# Patient Record
Sex: FEMALE | Race: WHITE | NOT HISPANIC OR LATINO | Employment: FULL TIME | URBAN - METROPOLITAN AREA
[De-identification: names, ages, dates, MRNs, and addresses within clinical notes are randomized per-mention and may not be internally consistent; named-entity substitution may affect disease eponyms.]

---

## 2017-03-31 ENCOUNTER — TELEPHONE (OUTPATIENT)
Dept: FAMILY MEDICINE | Facility: CLINIC | Age: 52
End: 2017-03-31

## 2017-03-31 DIAGNOSIS — I10 ESSENTIAL HYPERTENSION: ICD-10-CM

## 2017-03-31 NOTE — TELEPHONE ENCOUNTER
----- Message from Willa Haque sent at 3/31/2017  2:36 PM CDT -----  Contact: self [phone]   Patient requesting a refill on blood pressure medication.  Patient requesting an prescription or order for b12 shot.    Patient will be using   [id number] [place] - [address]  [place] [address]  Phone: [phone] Fax: [phone]    Please call patient at [phone]. Thanks!

## 2017-03-31 NOTE — TELEPHONE ENCOUNTER
----- Message from Baldemar Hill sent at 3/30/2017  4:25 PM CDT -----  Contact: Sander   Need refill on blood pressure medication. I scheduled an appointment but out in May. Also wants B12 shot    [facility][id number] - [place] - [address]. AT [facility]  [address].  [place] [address]  Phone: [phone] Fax: [phone]    Call back to let her know if she can get shot at [facility] if possible. [phone]. Thanks!

## 2017-04-02 RX ORDER — AMLODIPINE AND BENAZEPRIL HYDROCHLORIDE 5; 10 MG/1; MG/1
1 CAPSULE ORAL DAILY
Qty: 30 CAPSULE | Refills: 5 | OUTPATIENT
Start: 2017-04-02

## 2017-04-02 NOTE — TELEPHONE ENCOUNTER
This patient has not been seen in our office since 12/15.  She is requesting refills of medications.  She needs to get that from whoever has been providing her with refills until our upcoming appointment.  We cannot address B12 injections until she is seen.  Please find out who has been filling her meds since 12/15.

## 2017-04-03 NOTE — TELEPHONE ENCOUNTER
Spoke with patient and advised her that she has not been seen since 2015 and no refills could be authorized until her appointment.

## 2017-04-20 ENCOUNTER — PATIENT OUTREACH (OUTPATIENT)
Dept: ADMINISTRATIVE | Facility: HOSPITAL | Age: 52
End: 2017-04-20

## 2017-05-01 ENCOUNTER — PATIENT OUTREACH (OUTPATIENT)
Dept: ADMINISTRATIVE | Facility: HOSPITAL | Age: 52
End: 2017-05-01

## 2017-05-01 NOTE — LETTER
May 9, 2017    Lori Herrera  [address]  [place] [address]             [facility]  [address]  [facility] [address]  Phone: [phone] Dear Mrs. Herrera:    We have tried to reach you by mychart unsuccessfully.        [facility] is committed to your overall health.  To help you get the most out of each of your visits, we will review your information to make sure you are up to date on all of your recommended tests and/or procedures.       Dr. Hutchison has found that you may be due for tetanus vaccine, mammogram, pap smear.     If you have had any of the above done at another facility, please bring the records or information with you so that your record at [facility] will be complete.  If you would like to schedule any of these, please contact me.     If you are currently taking medication, please bring it with you to your appointment for review.     Ca Wright LPN Clinical Care Coordinator   [facility] [address] [facility] [address]   [place] [address]   [phone] (p)   [phone] (f)

## 2017-05-12 ENCOUNTER — TELEPHONE (OUTPATIENT)
Dept: FAMILY MEDICINE | Facility: CLINIC | Age: 52
End: 2017-05-12

## 2017-05-12 NOTE — TELEPHONE ENCOUNTER
----- Message from Bruna Hook sent at 5/12/2017  9:26 AM CDT -----  Contact: self  Patient wants to speak with a nurse regarding her upcoming appointment. Please call back at [phone] (pcpe)

## 2017-05-12 NOTE — TELEPHONE ENCOUNTER
Spoke with patient regarding appointment, patient cancelled upcoming appointment due to transportation problems, she was rescheduled to June, advised patient to call a week before appointment for lab orders, she verbalized understanding.

## 2017-06-01 ENCOUNTER — PATIENT OUTREACH (OUTPATIENT)
Dept: ADMINISTRATIVE | Facility: HOSPITAL | Age: 52
End: 2017-06-01

## 2018-04-10 ENCOUNTER — OFFICE VISIT (OUTPATIENT)
Dept: PRIMARY CARE CLINIC | Facility: CLINIC | Age: 53
End: 2018-04-10
Payer: COMMERCIAL

## 2018-04-10 ENCOUNTER — TELEPHONE (OUTPATIENT)
Dept: FAMILY MEDICINE | Facility: CLINIC | Age: 53
End: 2018-04-10

## 2018-04-10 VITALS
TEMPERATURE: 99 F | HEART RATE: 76 BPM | HEIGHT: 64 IN | BODY MASS INDEX: 40.83 KG/M2 | WEIGHT: 239.19 LBS | SYSTOLIC BLOOD PRESSURE: 116 MMHG | DIASTOLIC BLOOD PRESSURE: 72 MMHG

## 2018-04-10 DIAGNOSIS — Z00.00 HEALTHCARE MAINTENANCE: ICD-10-CM

## 2018-04-10 DIAGNOSIS — S09.90XA TRAUMATIC INJURY OF HEAD, INITIAL ENCOUNTER: Primary | ICD-10-CM

## 2018-04-10 PROCEDURE — 99214 OFFICE O/P EST MOD 30 MIN: CPT | Mod: S$GLB,,, | Performed by: NURSE PRACTITIONER

## 2018-04-10 PROCEDURE — 99999 PR PBB SHADOW E&M-EST. PATIENT-LVL IV: CPT | Mod: PBBFAC,,, | Performed by: NURSE PRACTITIONER

## 2018-04-10 RX ORDER — TRAMADOL HYDROCHLORIDE 50 MG/1
50 TABLET ORAL EVERY 6 HOURS PRN
Qty: 15 TABLET | Refills: 0 | Status: SHIPPED | OUTPATIENT
Start: 2018-04-10 | End: 2018-04-10 | Stop reason: SDUPTHER

## 2018-04-10 RX ORDER — TRAMADOL HYDROCHLORIDE 50 MG/1
50 TABLET ORAL EVERY 6 HOURS PRN
Qty: 15 TABLET | Refills: 0 | Status: SHIPPED | OUTPATIENT
Start: 2018-04-10 | End: 2018-04-20

## 2018-04-10 NOTE — MEDICAL/APP STUDENT
Lori Herrera is a 52 y.o. female patient of Dr. Mary Hutchison MD who presents to the clinic today for   Chief Complaint   Patient presents with    Headache    Fall   .    HPI    Patient, who is not known to me, reports a new problem to me: She slipped and fell on Wednesday and hit her head. She reports a headache that began yesterday and has been intermittent and not constant. She mother passed away last Wednesday and she was at her parents house when she slipped and fell in the kitchen and hit her head on the cabinet.     These symptoms began yesterday ago and status is not improving.     Pt denies the following symptoms: loss of consciousness, changes in mental status, changes in vision, dizziness, clear drainage from nose or ears, ringing in ears, nausea, vomiting, changes in balance or gait     Aggravating factors include none .    Relieving factors include Advil and tylenol.    OTC Medications tried are Advil and tylenol.    Prescription medications taken for symptoms are none.    She has no h/o  Migraine or tension headaches    Pertinent medical history:  No history of migraines, she get a headache occasionally     ROS    Constitutional:  Denies fever, denies fatigue.    Head:  Reports headache mostly located to the right posterior of the head where she hit it on the cabnet, denies dizziness. Report of head injury to the posterior head.  Ears:   denies drainage from the ear(s).  No difficulty hearing.  Eyes:  Denies change in vision.  No lesions or d/c present.  Nose:   No runny nose or bleeding from the nose.  Throat:  No ST pain, exudate, difficulty swallowing.    Heart/Lung:  No new sxs    GI: denies stomach ache, denies nausea, denies vomiting.    Urinary:  No changes.    MS:  No new bone, joint or muscle problems.    NEURO:  See Chief Complaint.    Skin:  No rashes, itching..      PAST MEDICAL HISTORY:  Past Medical History:   Diagnosis Date    Allergic rhinitis, cause unspecified     HTN  (hypertension)     Nasal fracture        PAST SURGICAL HISTORY:  Past Surgical History:   Procedure Laterality Date    APPENDECTOMY       SECTION, CLASSIC      CHOLECYSTECTOMY      NASAL FRACTURE SURGERY         SOCIAL HISTORY:  Social History     Social History    Marital status: Single     Spouse name: N/A    Number of children: 1    Years of education: N/A     Occupational History    Not on file.     Social History Main Topics    Smoking status: Never Smoker    Smokeless tobacco: Never Used    Alcohol use Yes      Comment: occasionally    Drug use: No    Sexual activity: Yes     Partners: Male     Other Topics Concern    Not on file     Social History Narrative    No narrative on file       FAMILY HISTORY:  Family History   Problem Relation Age of Onset    Colon polyps Mother     Hypertension Father     Parkinsonism Father     Diabetes Brother     Hypertension Brother     Cancer Maternal Grandmother      colon    Cancer Maternal Grandfather      lung       ALLERGIES AND MEDICATIONS: updated and reviewed.  Review of patient's allergies indicates:   Allergen Reactions    Penicillins Shortness Of Breath     Current Outpatient Prescriptions   Medication Sig Dispense Refill    amlodipine-benazepril 5-10 mg (LOTREL) 5-10 mg per capsule TAKE 1 CAPSULE BY MOUTH ONCE DAILY. 30 capsule 0    guaifenesin (MUCINEX) 1,200 mg TM12 Take by mouth.        ibuprofen (ADVIL,MOTRIN) 200 MG tablet Take 200 mg by mouth every 6 (six) hours as needed.       No current facility-administered medications for this visit.          PHYSICAL EXAM    Alert, coop 52 y.o. female patient in no acute distress.    Vitals:    04/10/18 1652   BP: 116/72   Pulse: 76   Temp: 99.1 °F (37.3 °C)     VS reviewed.  VSS.  CC, nursing note, medications & PMH all reviewed today.    Head:  Normocephalic, atraumatic.    EENT:  EACs patent.  TMs intact LR, no erythema, no effusions.               Eye lids normal, no discharge  present.  PERRLA present bilat.               no d/c from nares.     Pharynx not injected                 No cervical lymphadenopathy noted.    Resp:  Respirations regular, nonlabored   Lungs CTA bilat.     Heart:  RRR, no MRG.  CV:  Cap RF brisk, radial pulses 2+ bilat, DP pulses 2+ bilat.      MS:  Full ROM of extremities bilat, symmetrical movement.          Abduction and adduction of the UEs is strong, strength symmetrical .          Flexion and extension of the LEs is strong, strength symmetrical. Neck is supple.      NEURO:  Alert and oriented x 4.  Responds appropriately during interaction.                  Gait steady, balance good.   Moves all extremities evenly, symmetrically.                  CN II-XII intact bilat.  No focal findings.                  Sensation to light touch intact over BUE and BLE .                  Able to do point-to-point, rapid alternating hand movements.                  Able to heel- and toe-walk.                  Romberg normal, pronator drift neg.    Skin:  Warm, dry, color good.    Psych:  Responds appropriately throughout the visit.               Relaxed.  Well-groomed

## 2018-04-10 NOTE — PROGRESS NOTES
Lori Herrera is a 52 y.o. female patient of Dr. Mary Hutchison MD who presents to the clinic today for       Chief Complaint   Patient presents with    Headache    Fall   .     HPI     Patient, who is not known to me, reports a new problem to me: She slipped and fell on Wednesday, 4/4/18 and hit her head. She reports a headache that began yesterday and has been intermittent and not constant. She mother passed away on 4/4/18 and she was at her parents house when she slipped and fell in the kitchen and hit her head on the cabinet.   She has a sore spot on the post crown of the head and on the     These symptoms began yesterday ago and status is not improving.      Pt denies the following symptoms: loss of consciousness, changes in mental status, changes in vision, dizziness, clear drainage from nose or ears, ringing in ears, nausea, vomiting, changes in balance or gait      Aggravating factors include none .     Relieving factors include Advil and tylenol.     OTC Medications tried are Advil and tylenol.     Prescription medications taken for symptoms are none.     She has no h/o  Migraine or tension headaches     Pertinent medical history:  No history of migraines, she get a headache occasionally      ROS     Constitutional:  Denies fever, denies fatigue.     Head:  Reports headache mostly located to the right posterior of the head where she hit it on the cabinet, denies dizziness. Report of head injury to the posterior head.  Ears:   denies drainage from the ear(s).  No difficulty hearing.  Eyes:  Denies change in vision.  No lesions or d/c present.  Nose:   No runny nose or bleeding from the nose.  Throat:  No ST pain, exudate, difficulty swallowing.     Heart/Lung:  No new sxs     GI: denies stomach ache, denies nausea, denies vomiting.     Urinary:  No changes.     MS:  No new bone, joint or muscle problems.     NEURO:  See Chief Complaint.     Skin:  No rashes, itching..        PAST MEDICAL HISTORY:        Past Medical History:   Diagnosis Date    Allergic rhinitis, cause unspecified      HTN (hypertension)      Nasal fracture           PAST SURGICAL HISTORY:        Past Surgical History:   Procedure Laterality Date    APPENDECTOMY         SECTION, CLASSIC        CHOLECYSTECTOMY       NASAL FRACTURE SURGERY             SOCIAL HISTORY:  Social History   Social History            Social History    Marital status: Single       Spouse name: N/A    Number of children: 1    Years of education: N/A          Occupational History    Not on file.             Social History Main Topics    Smoking status: Never Smoker    Smokeless tobacco: Never Used    Alcohol use Yes         Comment: occasionally    Drug use: No    Sexual activity: Yes       Partners: Male           Other Topics Concern    Not on file          Social History Narrative    No narrative on file            FAMILY HISTORY:         Family History   Problem Relation Age of Onset    Colon polyps Mother      Hypertension Father      Parkinsonism Father      Diabetes Brother      Hypertension Brother      Cancer Maternal Grandmother         colon    Cancer Maternal Grandfather         lung         ALLERGIES AND MEDICATIONS: updated and reviewed.       Review of patient's allergies indicates:   Allergen Reactions    Penicillins Shortness Of Breath      Current Medications          Current Outpatient Prescriptions   Medication Sig Dispense Refill    amlodipine-benazepril 5-10 mg (LOTREL) 5-10 mg per capsule TAKE 1 CAPSULE BY MOUTH ONCE DAILY. 30 capsule 0    guaifenesin (MUCINEX) 1,200 mg TM12 Take by mouth.          ibuprofen (ADVIL,MOTRIN) 200 MG tablet Take 200 mg by mouth every 6 (six) hours as needed.          No current facility-administered medications for this visit.                PHYSICAL EXAM     Alert, coop 52 y.o. female patient in no acute distress.         Vitals:     04/10/18 1652   BP: 116/72   Pulse: 76   Temp:  99.1 °F (37.3 °C)      VS reviewed.  VSS.  CC, nursing note, medications & PMH all reviewed today.     Head:  Normocephalic, atraumatic.     EENT:  EACs patent.  TMs intact LR, no erythema, no effusions.               Eye lids normal, no discharge present.  PERRLA present bilat.               no d/c from nares.               Pharynx not injected                 No cervical lymphadenopathy noted.     Resp:  Respirations regular, nonlabored              Lungs CTA bilat.      Heart:  RRR, no MRG.  CV:  Cap RF brisk, radial pulses 2+ bilat, DP pulses 2+ bilat.       MS:  Full ROM of extremities bilat, symmetrical movement.          Abduction and adduction of the UEs is strong, strength symmetrical .          Flexion and extension of the LEs is strong, strength symmetrical. Neck is supple.       NEURO:  Alert and oriented x 4.  Responds appropriately during interaction.                  Gait steady, balance good.   Moves all extremities evenly, symmetrically.                  CN II-XII intact bilat.  No focal findings.                  Sensation to light touch intact over BUE and BLE .                  Able to do point-to-point, rapid alternating hand movements.                  Able to heel- and toe-walk.                  Romberg normal, pronator drift neg.     Skin:  Warm, dry, color good.     Psych:  Responds appropriately throughout the visit.               Relaxed.  Well-groomed    Traumatic injury of head, initial encounter  -     CT Head Without Contrast; Future; Expected date: 04/10/2018  -     Discontinue: traMADol (ULTRAM) 50 mg tablet; Take 1 tablet (50 mg total) by mouth every 6 (six) hours as needed for Pain.  Dispense: 15 tablet; Refill: 0  -     traMADol (ULTRAM) 50 mg tablet; Take 1 tablet (50 mg total) by mouth every 6 (six) hours as needed for Pain.  Dispense: 15 tablet; Refill: 0    Healthcare maintenance  Comments:  labs only  Orders:  -     Comprehensive metabolic panel; Future; Expected date:  04/10/2018  -     CBC auto differential; Future; Expected date: 04/10/2018  -     TSH; Future; Expected date: 04/10/2018  -     Vitamin D; Future; Expected date: 04/10/2018      Pt today presents with report of head injury 6 days ago, which caused 2 contusions on the post scalp.  Yesterday she started with headache but no neuro sxs.  Neuro exam normal/neg..      This is a new problem to me and the following work up is planned.    Lab & Radiological Tests Ordered: CT head.  The results are pending.      Pt advised to perform comfort measures recommended on patient instruction sheet .    Head injury info given.  If worse or concerns, the patient is advised to call us.  Explained exam findings, diagnosis and treatment plan to patient.  Questions answered and patient states understanding.

## 2018-04-10 NOTE — TELEPHONE ENCOUNTER
----- Message from Mik Vickers sent at 4/10/2018 10:30 AM CDT -----  Contact: Pt            Name of Who is Calling: VAISHALI MOSES [[id number]]      What is the request in detail: Discussing concerns of headache she has been experiencing since yesterday after fall last Wednesday. Pt states that she fell and hit her head pretty hard at mothers .       Can the clinic reply by [facility]:No      What Number to Call Back if not in [facility]: [phone] (home)

## 2018-04-10 NOTE — PATIENT INSTRUCTIONS
Head Injury (Adult)    You have a head injury. It does not appear serious at this time. But symptoms of a more serious problem, such as a mild brain injury (concussion) or bruising or bleeding in the brain, may appear later. For this reason, you or someone caring for you will need to watch for the symptoms listed below. Once youre home, also be sure to follow any care instructions youre given.  Home care  Watch for the following symptoms  Seek emergency medical care if you have any of these symptoms over the next hours to days:   · Headache  · Nausea or vomiting  · Dizziness  · Sensitivity to light or noise  · Unusual sleepiness or grogginess  · Trouble falling asleep  · Personality changes  · Vision changes  · Memory loss  · Confusion  · Trouble walking or clumsiness  · Loss of consciousness (even for a short time)  · Inability to be awakened  · Stiff neck  · Weakness or numbness in any part of the body  · Seizures  General care  · If you were prescribed medicines for pain, use them as directed. Note: Dont take other medicines for pain without talking to your provider first.  · To help reduce swelling and pain, apply a cold source to the injured area for up to 20 minutes at a time. Do this as often as directed. Use a cold pack or bag of ice wrapped in a thin towel. Never apply a cold source directly to the skin.  · If you have cuts or scrapes as a result of your head injury, care for them as directed.  · For the next 24 hours (or longer, if instructed):  ¨ Dont drink alcohol or use sedatives or other medicines that make you sleepy.  ¨ Dont drive or operate machinery.  ¨ Dont do anything strenuous, such as heavy lifting or straining.  ¨ Limit tasks that require concentration. This includes reading, using a smartphone or computer, watching TV, and playing video games.  ¨ Dont return to sports or other activities that could result in another head injury.  Follow-up care  Follow up with your healthcare  provider, or as directed. If imaging tests were done, they will be reviewed by a doctor. You will be told the results and any new findings that may affect your care.  When to seek medical advice  Call your healthcare provider right away if any of these occur:  · Pain doesnt get better or worsens  · New or increased swelling or bruising  · Fever of 100.4°F (38°C) or higher, or as directed by your provider  · Increased redness, warmth, drainage, or bleeding from the injured area  · Fluid drainage or bleeding from the nose or ears  · Any depression or bony abnormality in the injured area  Date Last Reviewed: 9/26/2015 © 2000-2017 [employer]. [address] [place] [address], [place] [address]. All rights reserved. This information is not intended as a substitute for professional medical care. Always follow your healthcare professional's instructions.    If you have any questions, please call.  You can reach us at [phone] Monday through Thursday (except holidays) 10 a.m. to 6 p.m. or call Dr. Hutchison's nurse.    Thank you for using the [facility]!    YOVANI Siddiqui, CNP, FNP-BC  [facility]    To rate your experience with LEONILA Siddiqui, click on the link below:        [email or web address]

## 2018-04-10 NOTE — Clinical Note
Mary Hutchison MD,  I saw your patient today in the [facility].  If you have any questions, please do not hesitate to contact me.  Thank you!  LEONILA Siddiqui

## 2018-04-12 ENCOUNTER — TELEPHONE (OUTPATIENT)
Dept: PRIMARY CARE CLINIC | Facility: CLINIC | Age: 53
End: 2018-04-12

## 2018-04-12 NOTE — TELEPHONE ENCOUNTER
"Informed pt she will need to establish care to get amlodipine refilled plus with Dr. Hutchison leaving soon. Pt stated she is very confused because she thought she was getting medication refilled by Flor. Spoke with Flor who stated she has not said she was going to fill it. Stated she will have labs done prior to appointment with [facility] for BP med refill. Informed pt she has not been seen by Dr. Hutchison since 2014 and Josiane Rios since 2015. Pt states she has been seeing a doctor outside of [facility] for her "wellness" who has been monitoring BP and filling meds. Informed her in order for us to refill bp med she will need to be seen by family medicine provider. Informed pt a last refill was sent by Dr. Hutchison on 4/2017. Pt states "I did not take that one due to the doctor that has been seeing me for my wellness." Pt did not inform me of the provider who has been seeing her. Pt stated she had canceled Dr. Hutchison appt on 4/19 at 1740 due to thinking BP med being filled by [facility]. Informed pt Flor is not a provider in [facility] who follows maintenance medications and will not fill due to needing to be seen since last appt was in  2015. Pt stated she is "aggravated and does not understand why she needs to est care to get medication she has been on for years." Again, explained to pt this is a maintenance medication that is prescribed based on BP and needs to be followed by her primary. Offered pt an appt for Monday with Raeann ALFARO NP but pt stated she does not want that appt, but will just keep Dr. Hutchison appt next week that she has canceled. Informed her she will still need to establish care with another provider since Dr. Hutchison will be leaving soon. Offered to est care with Dr. Nolasco, but pt turned down appt offer. Was able to place pt back on Dr. Hutchison's schedule for 4/19 at 1740. Pt confirmed.   "

## 2018-04-12 NOTE — TELEPHONE ENCOUNTER
----- Message from Anamika Monroe sent at 4/12/2018 11:23 AM CDT -----  Type:  RX Refill Request    Who Called:  Patient  Refill or New Rx:  Refill  RX Name and Strength:  amlodipine-benazepril 5-10 mg (LOTREL) 5-10 mg per capsule   How is the patient currently taking it? (ex. 1XDay):  1xday  Is this a 30 day or 90 day RX:  90 day, 3 refills  Preferred Pharmacy with phone number:    [facility]/pharmacy #[id number] - [place] - [address]. [address] [place] [address]  Phone: [phone] Fax: [phone]  Local or Mail Order:  local  Ordering Provider:  same  Best Call Back Number:  [phone]  Additional Information:  Patient was seen on 4/10/18 but office forgot to send in her refill on her blood pressure medication. Please call when completed

## 2018-04-12 NOTE — TELEPHONE ENCOUNTER
I have not seen this pt. Since 2014.  Please write for her med, she will need to be seen for new provider.  She has cancelled multiple appts. With me.

## 2018-04-19 ENCOUNTER — TELEPHONE (OUTPATIENT)
Dept: PRIMARY CARE CLINIC | Facility: CLINIC | Age: 53
End: 2018-04-19

## 2018-04-19 ENCOUNTER — TELEPHONE (OUTPATIENT)
Dept: FAMILY MEDICINE | Facility: CLINIC | Age: 53
End: 2018-04-19

## 2018-04-19 DIAGNOSIS — I10 ESSENTIAL HYPERTENSION: ICD-10-CM

## 2018-04-19 NOTE — TELEPHONE ENCOUNTER
----- Message from Kellie Field sent at 4/19/2018  2:44 PM CDT -----  Contact: pt  Pt Lori Herrera calling to get her CT scan orders faxed to [facility]: [phone]. She needs this ct scan of her head in no contrast faxed over ASAP.    Call back# [phone]  Thanks

## 2018-04-19 NOTE — TELEPHONE ENCOUNTER
Phoned pt to instruct that Dr Hutchison cannot see her today as she will be considered a new pt since it has been 3 yrs since being seen. LMOR to have pt call [facility]# as we R/S her for Kia Rankin NP at 3:20 PM today. My Chart message sent as well. CLC

## 2018-04-19 NOTE — TELEPHONE ENCOUNTER
----- Message from Edwar Hook sent at 4/19/2018 11:01 AM CDT -----  Contact: Patient  Lori, [phone], needs CT scan orders faxed to [facility]. She doesn't have a fax number handy but it's the one by the hospital on [address]. Please advise. Thanks.

## 2018-04-23 ENCOUNTER — OFFICE VISIT (OUTPATIENT)
Dept: FAMILY MEDICINE | Facility: CLINIC | Age: 53
End: 2018-04-23
Payer: COMMERCIAL

## 2018-04-23 ENCOUNTER — TELEPHONE (OUTPATIENT)
Dept: PRIMARY CARE CLINIC | Facility: CLINIC | Age: 53
End: 2018-04-23

## 2018-04-23 VITALS
DIASTOLIC BLOOD PRESSURE: 82 MMHG | SYSTOLIC BLOOD PRESSURE: 112 MMHG | OXYGEN SATURATION: 99 % | BODY MASS INDEX: 40.31 KG/M2 | HEART RATE: 74 BPM | WEIGHT: 236.13 LBS | HEIGHT: 64 IN

## 2018-04-23 DIAGNOSIS — Z12.31 ENCOUNTER FOR SCREENING MAMMOGRAM FOR BREAST CANCER: ICD-10-CM

## 2018-04-23 DIAGNOSIS — S00.03XA CONTUSION OF SCALP, INITIAL ENCOUNTER: Primary | ICD-10-CM

## 2018-04-23 DIAGNOSIS — Z00.00 PREVENTATIVE HEALTH CARE: Primary | ICD-10-CM

## 2018-04-23 DIAGNOSIS — I10 ESSENTIAL HYPERTENSION: ICD-10-CM

## 2018-04-23 PROCEDURE — 99396 PREV VISIT EST AGE 40-64: CPT | Mod: S$GLB,,, | Performed by: NURSE PRACTITIONER

## 2018-04-23 PROCEDURE — 99999 PR PBB SHADOW E&M-EST. PATIENT-LVL V: CPT | Mod: PBBFAC,,, | Performed by: NURSE PRACTITIONER

## 2018-04-23 RX ORDER — AMLODIPINE AND BENAZEPRIL HYDROCHLORIDE 5; 10 MG/1; MG/1
CAPSULE ORAL
Qty: 30 CAPSULE | Refills: 0 | Status: CANCELLED | OUTPATIENT
Start: 2018-04-23

## 2018-04-23 RX ORDER — AMLODIPINE AND BENAZEPRIL HYDROCHLORIDE 5; 10 MG/1; MG/1
1 CAPSULE ORAL DAILY
Qty: 90 CAPSULE | Refills: 3 | Status: SHIPPED | OUTPATIENT
Start: 2018-04-23 | End: 2019-03-21 | Stop reason: SDUPTHER

## 2018-04-23 RX ORDER — TRAMADOL HYDROCHLORIDE 50 MG/1
50 TABLET ORAL EVERY 12 HOURS PRN
Qty: 20 TABLET | Refills: 0 | Status: SHIPPED | OUTPATIENT
Start: 2018-04-23 | End: 2018-05-01

## 2018-04-23 NOTE — PROGRESS NOTES
Subjective:       Patient ID: Lori Herrera is a 52 y.o. female.    Chief Complaint: Annual Exam and Medication Refill    Patient is here for preventative health care visit. Taking Lotrel for HTN, stable. Due for multiple health screenings. Last labs were done .    TETANUS VACCINE due on 1985  Pap Smear with HPV Cotest due on 1988  Mammogram due on 10/15/2014  Influenza Vaccine due on 2017  Colonoscopy due on 2017  Lipid Panel due on 10/15/2017    Past Medical History:  Past Medical History:  No date: Allergic rhinitis, cause unspecified  No date: HTN (hypertension)  No date: Nasal fracture  Past Surgical History:  No date: APPENDECTOMY  No date:  SECTION, CLASSIC  : CHOLECYSTECTOMY  No date: NASAL FRACTURE SURGERY  Review of patient's allergies indicates:   -- Penicillins -- Shortness Of Breath  Current Outpatient Prescriptions on File Prior to Visit:  guaifenesin (MUCINEX) 1,200 mg TM12, Take by mouth.  , Disp: , Rfl:   ibuprofen (ADVIL,MOTRIN) 200 MG tablet, Take 200 mg by mouth every 6 (six) hours as needed., Disp: , Rfl:   (DISCONTINUED) amlodipine-benazepril 5-10 mg (LOTREL) 5-10 mg per capsule, TAKE 1 CAPSULE BY MOUTH ONCE DAILY., Disp: 30 capsule, Rfl: 0    No current facility-administered medications on file prior to visit.     Social History    Marital status: Single              Spouse name:                       Years of education:                 Number of children: 1             Occupational History    None on file    Social History Main Topics    Smoking status: Never Smoker                                                                Smokeless tobacco: Never Used                        Alcohol use: Yes                Comment: occasionally    Drug use: No              Sexual activity: Yes               Partners with: Male    Other Topics            Concern    None on file    Social History Narrative    None on file      Review of patient's family history  indicates:    Colon polyps                   Mother                    Hypertension                   Father                    Parkinsonism                   Father                    Diabetes                       Brother                   Hypertension                   Brother                   Cancer                         Maternal Grandmother        Comment: colon    Cancer                         Maternal Grandfather        Comment: lung            Review of Systems   Constitutional: Negative.  Negative for chills and fever.   HENT: Negative.  Negative for postnasal drip and rhinorrhea.    Respiratory: Negative.  Negative for cough and shortness of breath.    Cardiovascular: Negative.  Negative for chest pain and leg swelling.   Gastrointestinal: Negative.  Negative for abdominal pain, constipation and diarrhea.   Genitourinary: Negative.  Negative for dysuria.   Musculoskeletal: Negative.  Negative for back pain.   Skin: Negative for rash.   Neurological: Positive for headaches (had head injury last week, had normal Ct scan). Negative for dizziness, seizures and speech difficulty.   Hematological: Negative.    Psychiatric/Behavioral: Negative.        Objective:      Physical Exam   Constitutional: She is oriented to person, place, and time. No distress.   HENT:   Head: Normocephalic and atraumatic.   Eyes: Pupils are equal, round, and reactive to light.   Neck: Normal range of motion.   Cardiovascular: Normal rate and regular rhythm.  Exam reveals no friction rub.    No murmur heard.  Pulmonary/Chest: Effort normal and breath sounds normal. No respiratory distress. She has no wheezes.   Abdominal: Soft. Bowel sounds are normal. She exhibits no distension. There is no tenderness.   Musculoskeletal: Normal range of motion. She exhibits no edema or deformity.   Neurological: She is alert and oriented to person, place, and time. No cranial nerve deficit.   Skin: No rash noted. She is not diaphoretic. No  erythema.   Psychiatric: She has a normal mood and affect. Her behavior is normal.   Vitals reviewed.      Assessment:       1. Preventative health care    2. Essential hypertension    3. Encounter for screening mammogram for breast cancer        Plan:       1. Preventative health care  Health maintenance reviewed. Advised to schedule women's health visit.   - CBC auto differential; Future  - Comprehensive metabolic panel; Future  - Lipid panel; Future  - TSH; Future  - Hemoglobin A1c; Future  - Vitamin D; Future  - Case request GI: COLONOSCOPY    2. Essential hypertension  Stable, continue current medication. Fasting labs ordered.  - amlodipine-benazepril 5-10 mg (LOTREL) 5-10 mg per capsule; Take 1 capsule by mouth once daily.  Dispense: 90 capsule; Refill: 3    3. Encounter for screening mammogram for breast cancer    - Mammo Digital Screening Bilateral With CAD; Future

## 2018-04-23 NOTE — TELEPHONE ENCOUNTER
Patient has appointment with you this afternoon, Forwarded  by Dr Hutchison to Kia Rankin, Has not been seen by Kia.

## 2018-04-23 NOTE — TELEPHONE ENCOUNTER
Received CT of head with out contrast results on pt from [facility].per Sharon Miller NP she stated CT was normal, no brain bleed.     Called pt, notified of results per Sharon Miller NP and she verbally understood. Pt stated it is still sore to the touch. She stated she would like to have a refill on tramadol. Please advise.

## 2018-06-08 ENCOUNTER — LAB VISIT (OUTPATIENT)
Dept: LAB | Facility: HOSPITAL | Age: 53
End: 2018-06-08
Attending: NURSE PRACTITIONER
Payer: COMMERCIAL

## 2018-06-08 DIAGNOSIS — Z00.00 PREVENTATIVE HEALTH CARE: ICD-10-CM

## 2018-06-08 LAB
25(OH)D3+25(OH)D2 SERPL-MCNC: 30 NG/ML
CHOLEST SERPL-MCNC: 222 MG/DL
CHOLEST/HDLC SERPL: 3.8 {RATIO}
ESTIMATED AVG GLUCOSE: 120 MG/DL
HBA1C MFR BLD HPLC: 5.8 %
HDLC SERPL-MCNC: 58 MG/DL
HDLC SERPL: 26.1 %
LDLC SERPL CALC-MCNC: 141.2 MG/DL
NONHDLC SERPL-MCNC: 164 MG/DL
TRIGL SERPL-MCNC: 114 MG/DL
TSH SERPL DL<=0.005 MIU/L-ACNC: 1.51 UIU/ML

## 2018-06-08 PROCEDURE — 82306 VITAMIN D 25 HYDROXY: CPT

## 2018-06-08 PROCEDURE — 36415 COLL VENOUS BLD VENIPUNCTURE: CPT | Mod: PO

## 2018-06-08 PROCEDURE — 84443 ASSAY THYROID STIM HORMONE: CPT

## 2018-06-08 PROCEDURE — 83036 HEMOGLOBIN GLYCOSYLATED A1C: CPT

## 2018-06-08 PROCEDURE — 80061 LIPID PANEL: CPT

## 2019-03-20 ENCOUNTER — TELEPHONE (OUTPATIENT)
Dept: FAMILY MEDICINE | Facility: CLINIC | Age: 54
End: 2019-03-20

## 2019-03-20 NOTE — TELEPHONE ENCOUNTER
----- Message from Amadou Torres sent at 3/20/2019  4:42 PM CDT -----  Contact: Patient  Type: Needs Medical Advice    Who Called:  Patient  Best Call Back Number: [phone]  Additional Information: Patient would like a steroid shot and antibiotics for their wheezing cough. Patient has yet to re-establish with a doctor but was last seen by Bruna Hook. Please call to advise. Thanks!

## 2019-03-21 ENCOUNTER — OFFICE VISIT (OUTPATIENT)
Dept: FAMILY MEDICINE | Facility: CLINIC | Age: 54
End: 2019-03-21
Payer: COMMERCIAL

## 2019-03-21 VITALS
DIASTOLIC BLOOD PRESSURE: 88 MMHG | OXYGEN SATURATION: 98 % | SYSTOLIC BLOOD PRESSURE: 110 MMHG | BODY MASS INDEX: 39.33 KG/M2 | HEART RATE: 83 BPM | HEIGHT: 64 IN | WEIGHT: 230.38 LBS

## 2019-03-21 DIAGNOSIS — J20.8 ACUTE BACTERIAL BRONCHITIS: Primary | ICD-10-CM

## 2019-03-21 DIAGNOSIS — I10 ESSENTIAL HYPERTENSION: ICD-10-CM

## 2019-03-21 DIAGNOSIS — B96.89 ACUTE BACTERIAL BRONCHITIS: Primary | ICD-10-CM

## 2019-03-21 PROCEDURE — 99999 PR PBB SHADOW E&M-EST. PATIENT-LVL III: CPT | Mod: PBBFAC,,, | Performed by: NURSE PRACTITIONER

## 2019-03-21 PROCEDURE — 99999 PR PBB SHADOW E&M-EST. PATIENT-LVL III: ICD-10-PCS | Mod: PBBFAC,,, | Performed by: NURSE PRACTITIONER

## 2019-03-21 PROCEDURE — 99214 OFFICE O/P EST MOD 30 MIN: CPT | Mod: S$GLB,,, | Performed by: NURSE PRACTITIONER

## 2019-03-21 PROCEDURE — 99214 PR OFFICE/OUTPT VISIT, EST, LEVL IV, 30-39 MIN: ICD-10-PCS | Mod: S$GLB,,, | Performed by: NURSE PRACTITIONER

## 2019-03-21 RX ORDER — PROMETHAZINE HYDROCHLORIDE AND DEXTROMETHORPHAN HYDROBROMIDE 6.25; 15 MG/5ML; MG/5ML
5 SYRUP ORAL 3 TIMES DAILY PRN
Qty: 240 ML | Refills: 0 | Status: SHIPPED | OUTPATIENT
Start: 2019-03-21 | End: 2019-03-31

## 2019-03-21 RX ORDER — METHYLPREDNISOLONE 4 MG/1
TABLET ORAL
Qty: 1 PACKAGE | Refills: 0 | Status: SHIPPED | OUTPATIENT
Start: 2019-03-21 | End: 2019-04-11

## 2019-03-21 RX ORDER — LORATADINE 10 MG/1
10 TABLET ORAL DAILY PRN
COMMUNITY

## 2019-03-21 RX ORDER — ALBUTEROL SULFATE 90 UG/1
2 AEROSOL, METERED RESPIRATORY (INHALATION) EVERY 6 HOURS PRN
Qty: 18 G | Refills: 0 | Status: SHIPPED | OUTPATIENT
Start: 2019-03-21 | End: 2019-04-12 | Stop reason: SDUPTHER

## 2019-03-21 RX ORDER — AMLODIPINE AND BENAZEPRIL HYDROCHLORIDE 5; 10 MG/1; MG/1
1 CAPSULE ORAL DAILY
Qty: 90 CAPSULE | Refills: 3 | Status: SHIPPED | OUTPATIENT
Start: 2019-03-21 | End: 2020-05-05

## 2019-03-21 RX ORDER — AZITHROMYCIN 250 MG/1
TABLET, FILM COATED ORAL
Qty: 6 TABLET | Refills: 0 | Status: SHIPPED | OUTPATIENT
Start: 2019-03-21 | End: 2019-03-26

## 2019-03-21 RX ORDER — DOXYCYCLINE HYCLATE 100 MG
TABLET ORAL
Refills: 0 | COMMUNITY
Start: 2019-03-08 | End: 2019-03-21 | Stop reason: ALTCHOICE

## 2019-03-21 NOTE — PROGRESS NOTES
Subjective:       Patient ID: Lori Herrera is a 53 y.o. female.    Chief Complaint: Cough    Patient says she has been coughing for several weeks. She says she did an online doctors visit 2 weeks ago, took doxycycline x 10 days, no improvement. She has been taking dayquil and mucinex.  Patient has HTN, has been stable on current medication.   TETANUS VACCINE due on 1985  Pneumococcal Vaccine (Medium Risk)(1 of 1 - PPSV23) due on 1986  Pap Smear with HPV Cotest due on 1988  Mammogram due on 10/15/2014  Colonoscopy due on 2017  Influenza Vaccine due on 2018    Past Medical History:  Past Medical History:  No date: Allergic rhinitis, cause unspecified  No date: HTN (hypertension)  No date: Nasal fracture  Past Surgical History:  No date: APPENDECTOMY  No date:  SECTION, CLASSIC  : CHOLECYSTECTOMY  No date: NASAL FRACTURE SURGERY  Review of patient's allergies indicates:   -- Penicillins -- Shortness Of Breath  Current Outpatient Medications on File Prior to Visit:  amlodipine-benazepril 5-10 mg (LOTREL) 5-10 mg per capsule, Take 1 capsule by mouth once daily., Disp: 90 capsule, Rfl: 3  dextromethorphan HBr (VICKS DAYQUIL COUGH ORAL), Take by mouth., Disp: , Rfl:   guaifenesin (MUCINEX) 1,200 mg TM12, Take by mouth.  , Disp: , Rfl:   ibuprofen (ADVIL,MOTRIN) 200 MG tablet, Take 200 mg by mouth every 6 (six) hours as needed., Disp: , Rfl:   loratadine (CLARITIN) 10 mg tablet, Take 10 mg by mouth daily as needed for Allergies., Disp: , Rfl:   (DISCONTINUED) doxycycline (VIBRA-TABS) 100 MG tablet, 1 TAB BY MOUTH 2 TIMES PER DAY FOR 10 DAY(S), Disp: , Rfl: 0    No current facility-administered medications on file prior to visit.     Social History    Socioeconomic History      Marital status: Single      Spouse name: Not on file      Number of children: 1      Years of education: Not on file      Highest education level: Not on file    Social Needs      Financial resource strain:  Not on file      Food insecurity - worry: Not on file      Food insecurity - inability: Not on file      Transportation needs - medical: Not on file      Transportation needs - non-medical: Not on file    Occupational History      Not on file    Tobacco Use      Smoking status: Never Smoker      Smokeless tobacco: Never Used    Substance and Sexual Activity      Alcohol use: Yes        Comment: occasionally      Drug use: No      Sexual activity: Yes        Partners: Male    Other Topics      Concerns:        Not on file    Social History Narrative      Not on file    Review of patient's family history indicates:  Problem: Colon polyps      Relation: Mother          Age of Onset: (Not Specified)  Problem: Hypertension      Relation: Father          Age of Onset: (Not Specified)  Problem: Parkinsonism      Relation: Father          Age of Onset: (Not Specified)  Problem: Diabetes      Relation: Brother          Age of Onset: (Not Specified)  Problem: Hypertension      Relation: Brother          Age of Onset: (Not Specified)  Problem: Cancer      Relation: Maternal Grandmother          Age of Onset: (Not Specified)          Comment: colon  Problem: Cancer      Relation: Maternal Grandfather          Age of Onset: (Not Specified)          Comment: lung            Review of Systems   Constitutional: Positive for fatigue and fever. Negative for chills.   HENT: Positive for postnasal drip, rhinorrhea and sinus pain. Negative for sore throat.    Respiratory: Positive for cough and wheezing.    Cardiovascular: Negative.    Gastrointestinal: Positive for nausea. Negative for diarrhea and vomiting.   Neurological: Negative for dizziness, light-headedness and headaches.       Objective:      Physical Exam   Constitutional: She is oriented to person, place, and time. No distress.   HENT:   Head: Head is without Lucero's sign and without abrasion.   Right Ear: A middle ear effusion is present.   Left Ear: A middle ear effusion  is present.   Nose: Mucosal edema and rhinorrhea present. Right sinus exhibits no maxillary sinus tenderness and no frontal sinus tenderness. Left sinus exhibits no maxillary sinus tenderness and no frontal sinus tenderness.   Mouth/Throat: Uvula is midline. No oropharyngeal exudate or posterior oropharyngeal erythema.   Eyes: Pupils are equal, round, and reactive to light.   Neck: Normal range of motion.   Cardiovascular: Normal rate and regular rhythm. Exam reveals no friction rub.   No murmur heard.  Pulmonary/Chest: Effort normal and breath sounds normal. No stridor. No respiratory distress.   Abdominal: Soft. Bowel sounds are normal. She exhibits no distension. There is no tenderness.   Neurological: She is alert and oriented to person, place, and time.   Skin: She is not diaphoretic.   Psychiatric: She has a normal mood and affect. Her behavior is normal.   Vitals reviewed.      Assessment:       1. Acute bacterial bronchitis    2. Essential hypertension        Plan:       1. Essential hypertension  Stable, continue current medications.   - amlodipine-benazepril 5-10 mg (LOTREL) 5-10 mg per capsule; Take 1 capsule by mouth once daily.  Dispense: 90 capsule; Refill: 3    2. Acute bacterial bronchitis  Follow up if not resolving.   - methylPREDNISolone (MEDROL DOSEPACK) 4 mg tablet; use as directed  Dispense: 1 Package; Refill: 0  - promethazine-dextromethorphan (PROMETHAZINE-DM) 6.25-15 mg/5 mL Syrp; Take 5 mLs by mouth 3 (three) times daily as needed.  Dispense: 240 mL; Refill: 0  - albuterol (VENTOLIN HFA) 90 mcg/actuation inhaler; Inhale 2 puffs into the lungs every 6 (six) hours as needed for Wheezing. Rescue  Dispense: 18 g; Refill: 0  - azithromycin (Z-ANTONIO) 250 MG tablet; Take 2 tablets by mouth on day 1; Take 1 tablet by mouth on days 2-5  Dispense: 6 tablet; Refill: 0

## 2019-04-01 DIAGNOSIS — J20.8 ACUTE BACTERIAL BRONCHITIS: ICD-10-CM

## 2019-04-01 DIAGNOSIS — B96.89 ACUTE BACTERIAL BRONCHITIS: ICD-10-CM

## 2019-04-02 RX ORDER — AZITHROMYCIN 250 MG/1
TABLET, FILM COATED ORAL
Qty: 6 TABLET | Refills: 0 | OUTPATIENT
Start: 2019-04-02

## 2019-04-04 ENCOUNTER — TELEPHONE (OUTPATIENT)
Dept: FAMILY MEDICINE | Facility: CLINIC | Age: 54
End: 2019-04-04

## 2019-04-04 NOTE — TELEPHONE ENCOUNTER
I can not refill the same antibiotics, that can cause issues with resistance. We are supposed to see and evaluate patients prior to prescribing antibiotics. Doing another steroid pack this quickly without rechecking Bp, listening to her lungs, etc is not a good idea. I can suggest Flonase, mucinex, and delsym, but if she feels she needs antibiotics or steroids she is going to have to be evaluated so I can document that she needs them.    You met with Dr Diaz today to review the Carotid Ultrasound you had done.  He let you know that your ultrasound looks good, and would like you to have another Carotid Ultrasound in 1 year.  I will get in touch with you at that time.   Continue to keep good control of your diabetes and blood pressure, and continue your Pradaxa.

## 2019-04-04 NOTE — TELEPHONE ENCOUNTER
----- Message from Sushma Andino sent at 4/4/2019  9:32 AM CDT -----  Contact: pt  Calling in regards to get a refill on antibiotic and steroid pack sent to [facility] on [address] and family is still sick  please advise. [phone]

## 2019-04-04 NOTE — TELEPHONE ENCOUNTER
Called and told pt we would have to see her to refill her Rx. She was not happy however I did explain to her some of the reasons why this would not be aloud. I informed her that per [employer] she could use Flonase mucinx and delsym. Drink water. She verbalized understanding. She will not be able to come in for a few days. No car right now.

## 2019-04-04 NOTE — TELEPHONE ENCOUNTER
Pt sates she took antibiotics, got better.  son and  got it and now she has the deep cough and green snot. She is not able to come in for an eval.   Requesting refill of antibiotics and steroid.

## 2019-04-12 DIAGNOSIS — B96.89 ACUTE BACTERIAL BRONCHITIS: ICD-10-CM

## 2019-04-12 DIAGNOSIS — J20.8 ACUTE BACTERIAL BRONCHITIS: ICD-10-CM

## 2019-04-12 RX ORDER — ALBUTEROL SULFATE 90 UG/1
2 AEROSOL, METERED RESPIRATORY (INHALATION) EVERY 6 HOURS PRN
Qty: 8.5 INHALER | Refills: 0 | Status: SHIPPED | OUTPATIENT
Start: 2019-04-12 | End: 2019-04-23 | Stop reason: SDUPTHER

## 2020-01-07 ENCOUNTER — OFFICE VISIT (OUTPATIENT)
Dept: FAMILY MEDICINE | Facility: CLINIC | Age: 55
End: 2020-01-07
Payer: COMMERCIAL

## 2020-01-07 VITALS
HEART RATE: 94 BPM | DIASTOLIC BLOOD PRESSURE: 76 MMHG | OXYGEN SATURATION: 97 % | TEMPERATURE: 99 F | HEIGHT: 64 IN | SYSTOLIC BLOOD PRESSURE: 122 MMHG | BODY MASS INDEX: 40.95 KG/M2 | WEIGHT: 239.88 LBS

## 2020-01-07 DIAGNOSIS — M79.604 PAIN OF RIGHT LOWER EXTREMITY: Primary | ICD-10-CM

## 2020-01-07 DIAGNOSIS — M79.89 RIGHT LEG SWELLING: ICD-10-CM

## 2020-01-07 DIAGNOSIS — S80.11XA HEMATOMA OF LEG, RIGHT, INITIAL ENCOUNTER: ICD-10-CM

## 2020-01-07 PROCEDURE — 99214 OFFICE O/P EST MOD 30 MIN: CPT | Mod: S$GLB,,, | Performed by: NURSE PRACTITIONER

## 2020-01-07 PROCEDURE — 99999 PR PBB SHADOW E&M-EST. PATIENT-LVL IV: CPT | Mod: PBBFAC,,, | Performed by: NURSE PRACTITIONER

## 2020-01-07 PROCEDURE — 99214 PR OFFICE/OUTPT VISIT, EST, LEVL IV, 30-39 MIN: ICD-10-PCS | Mod: S$GLB,,, | Performed by: NURSE PRACTITIONER

## 2020-01-07 PROCEDURE — 99999 PR PBB SHADOW E&M-EST. PATIENT-LVL IV: ICD-10-PCS | Mod: PBBFAC,,, | Performed by: NURSE PRACTITIONER

## 2020-01-07 NOTE — PATIENT INSTRUCTIONS
Hematoma  A hematoma is a collection of blood trapped outside of a blood vessel. It is what we think of as a bruise or a contusion. It is usually seen under the skin as a black and blue spot on your arm or leg, or a bump on your head after an injury. It can be almost anywhere on or in your body. It can also occur in an internal organ where it can be more serious.  A hematoma is caused by an injury with damage to small blood vessels. This causes blood to leak into the tissues. Blood forms a pocket under the skin that swells and looks like a purplish patch.  Gradually the blood in the hematoma is absorbed back into the body. The swelling and pain of the hematoma will go away. This takes from 1 to 4 weeks, depending on the size of the hematoma. The skin over the hematoma may turn bluish then brown and yellow as the blood is dissolved and absorbed. Usually, this only takes a couple of weeks but can last months.  Home care  · Limit motion of the joints near the hematoma. If the hematoma is large and painful, avoid sports and other vigorous physical activity until the swelling and pain goes away.  · Apply an ice pack (ice cubes in a plastic bag, wrapped in a thin towel or a frozen bag of peas) over the injured area for 20 minutes every 1 to 2 hours the first day. Continue with ice packs 3 to 4 times a day for the next 2 days. Continue the use of ice packs for relief of pain and swelling as needed.  · If you need anything for pain, you can take acetaminophen, unless you were given a different pain medicine to use. Talk with your doctor before using this medicine if you have chronic liver or kidney disease. Also talk with your doctor if you have had a stomach ulcer or digestive tract bleeding, or are taking blood-thinner medicines.  Follow-up care  Follow up with your healthcare provider, or as advised. If X-rays or a CT scan were done, you will be notified if there is a change in the reading, especially if it affects  treatment.  When to seek medical advice  Call your healthcare provider right away f any of the following occur:  · Redness around the hematoma  · Increase in pain or warmth in the hematoma  · Increase in size of the hematoma  · Fever of 100.4ºF (38ºC) or higher, or as directed by your healthcare provider  · If the hematoma is on the arm or leg, watch for:  ¨ Increased swelling or pain in the extremity  ¨ Numbness or tingling or blue color of the hand or foot  Date Last Reviewed: 11/5/2015 © 2000-2017 [employer]. [address] [place] [address]. All rights reserved. This information is not intended as a substitute for professional medical care. Always follow your healthcare professional's instructions.

## 2020-01-07 NOTE — PROGRESS NOTES
Subjective:       Patient ID: Lori Herrera is a 54 y.o. female.    Chief Complaint: Leg Pain (knot on right leg right below knee)    Patient who is new to me presents for a knot to her knee. She was walking and hit her knee against the refrigerator on 12/20. She had extensive brusing and swelling to the leg but this has improved.     Leg Pain    The incident occurred more than 1 week ago. The injury mechanism was a direct blow. The pain is present in the right leg. The quality of the pain is described as aching. The pain is at a severity of 3/10. Pain severity now: Pain when walking or touching the area. The pain has been intermittent since onset. Associated symptoms include an inability to bear weight. Pertinent negatives include no numbness or tingling. She reports no foreign bodies present. The symptoms are aggravated by movement, palpation and weight bearing. She has tried elevation for the symptoms. The treatment provided mild relief.     Review of Systems   Constitutional: Negative for chills, fatigue and fever.   HENT: Negative for congestion, sinus pressure, sinus pain, sneezing and sore throat.    Respiratory: Negative for cough, chest tightness, shortness of breath and wheezing.    Cardiovascular: Positive for leg swelling. Negative for chest pain and palpitations.   Gastrointestinal: Negative for abdominal distention, abdominal pain, constipation, diarrhea, nausea and vomiting.   Genitourinary: Negative for decreased urine volume, difficulty urinating, dysuria, frequency and urgency.   Musculoskeletal: Positive for myalgias. Negative for arthralgias, gait problem and joint swelling.   Skin: Negative for rash and wound.   Neurological: Negative for dizziness, tingling, light-headedness, numbness and headaches.   Hematological: Bruises/bleeds easily.       Objective:      Physical Exam   Constitutional: She is oriented to person, place, and time. She appears well-developed and well-nourished.   HENT:    Head: Normocephalic and atraumatic.   Right Ear: External ear normal.   Left Ear: External ear normal.   Nose: Nose normal.   Mouth/Throat: Oropharynx is clear and moist.   Eyes: Pupils are equal, round, and reactive to light.   Neck: Normal range of motion.   Cardiovascular: Normal rate, regular rhythm, normal heart sounds and intact distal pulses.   Pulmonary/Chest: Effort normal and breath sounds normal.   Abdominal: Soft. Bowel sounds are normal.   Musculoskeletal: Normal range of motion.        Right lower leg: She exhibits tenderness and swelling. She exhibits no edema.        Legs:  Homans sign negative   Neurological: She is alert and oriented to person, place, and time.   Skin: Skin is warm and dry.   Nursing note and vitals reviewed.      Assessment:       1. Pain of right lower extremity    2. Right leg swelling    3. Hematoma of leg, right, initial encounter        Plan:       Lori was seen today for leg pain.    Diagnoses and all orders for this visit:    Pain of right lower extremity  -     US Lower Extremity Veins Bilateral; Future  -     US Soft Tissue Misc; Future    Right leg swelling  -     US Lower Extremity Veins Bilateral; Future  -     US Soft Tissue Misc; Future    Hematoma of leg, right, initial encounter      Discussed worsening signs/symptoms and when to return to clinic or go to ED.   Patient expresses understanding and agrees with treatment plan.

## 2020-05-05 DIAGNOSIS — I10 ESSENTIAL HYPERTENSION: ICD-10-CM

## 2020-05-05 RX ORDER — AMLODIPINE AND BENAZEPRIL HYDROCHLORIDE 5; 10 MG/1; MG/1
CAPSULE ORAL
Qty: 90 CAPSULE | Refills: 0 | Status: SHIPPED | OUTPATIENT
Start: 2020-05-05 | End: 2020-06-23 | Stop reason: SDUPTHER

## 2020-06-23 ENCOUNTER — OFFICE VISIT (OUTPATIENT)
Dept: FAMILY MEDICINE | Facility: CLINIC | Age: 55
End: 2020-06-23
Payer: COMMERCIAL

## 2020-06-23 VITALS
TEMPERATURE: 98 F | BODY MASS INDEX: 40.61 KG/M2 | SYSTOLIC BLOOD PRESSURE: 118 MMHG | HEIGHT: 64 IN | HEART RATE: 71 BPM | WEIGHT: 237.88 LBS | DIASTOLIC BLOOD PRESSURE: 80 MMHG | OXYGEN SATURATION: 97 %

## 2020-06-23 DIAGNOSIS — S13.4XXA WHIPLASH INJURY TO NECK, INITIAL ENCOUNTER: Primary | ICD-10-CM
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
## 2020-06-23 DIAGNOSIS — I10 ESSENTIAL HYPERTENSION: ICD-10-CM

## 2020-06-23 DIAGNOSIS — M54.2 NECK PAIN: ICD-10-CM

## 2020-06-23 PROCEDURE — 99214 PR OFFICE/OUTPT VISIT, EST, LEVL IV, 30-39 MIN: ICD-10-PCS | Mod: S$GLB,,, | Performed by: NURSE PRACTITIONER

## 2020-06-23 PROCEDURE — 99999 PR PBB SHADOW E&M-EST. PATIENT-LVL IV: CPT | Mod: PBBFAC,,, | Performed by: NURSE PRACTITIONER

## 2020-06-23 PROCEDURE — 99999 PR PBB SHADOW E&M-EST. PATIENT-LVL IV: ICD-10-PCS | Mod: PBBFAC,,, | Performed by: NURSE PRACTITIONER

## 2020-06-23 PROCEDURE — 99214 OFFICE O/P EST MOD 30 MIN: CPT | Mod: S$GLB,,, | Performed by: NURSE PRACTITIONER

## 2020-06-23 RX ORDER — CYCLOBENZAPRINE HCL 10 MG
10 TABLET ORAL NIGHTLY PRN
Qty: 14 TABLET | Refills: 0 | Status: SHIPPED | OUTPATIENT
Start: 2020-06-23 | End: 2020-07-01 | Stop reason: SDUPTHER

## 2020-06-23 RX ORDER — AMLODIPINE AND BENAZEPRIL HYDROCHLORIDE 5; 10 MG/1; MG/1
1 CAPSULE ORAL DAILY
Qty: 90 CAPSULE | Refills: 0 | Status: SHIPPED | OUTPATIENT
Start: 2020-06-23 | End: 2020-08-03 | Stop reason: SDUPTHER

## 2020-06-23 NOTE — PATIENT INSTRUCTIONS
Whiplash    When one car hits another, each persons body is thrown toward the impact, then away from it. This is whiplash. Even at slow speeds, the force puts stress and strain on the spine, especially the neck. The weight of the head stretches and damages muscles and ligaments, and may pull spinal bones out of line. Vertebrae (bones that protect your spinal cord) can be forced out of position. Discs (the spine's shock absorbers) can bulge, rupture, or wear down. Nerves can get pinched or inflamed. And muscles and ligaments can be stretched or torn.  Symptoms of whiplash  A wide array of symptoms can follow an auto accident. Symptoms may appear right away, or may be delayed for several days. Symptoms may include:  · Pain, especially in your neck, shoulder, arm, or lower back  · Arm or leg numbness  · Stiffness  · Headache  · Dizziness  Treating whiplash  You may be asked to do one or more of the following:  · Ice the injured area for 24 to 48 hours. Do this for 20 minutes. Repeat 5 times a day.  · After 48 hours, apply moist heat on the injured area for 20 minutes. Repeat 5 times a day.  · Wear a cervical collar for as long as recommended.  · Take nonsteroidal anti-inflammatory (NSAIDs) medicines or muscle relaxants as directed by your healthcare provider   Date Last Reviewed: 9/28/2015  © 9944-5929 [employer]. [address] [place] [address], [place] [address]. All rights reserved. This information is not intended as a substitute for professional medical care. Always follow your healthcare professional's instructions.
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
## 2020-06-27 ENCOUNTER — HOSPITAL ENCOUNTER (OUTPATIENT)
Dept: RADIOLOGY | Facility: HOSPITAL | Age: 55
Discharge: HOME | End: 2020-06-27
Attending: NURSE PRACTITIONER
Payer: COMMERCIAL

## 2020-06-27 DIAGNOSIS — S13.4XXA WHIPLASH INJURY TO NECK, INITIAL ENCOUNTER: ICD-10-CM

## 2020-06-27 PROCEDURE — 72040 XR CERVICAL SPINE AP LATERAL: ICD-10-PCS | Mod: 26,,, | Performed by: RADIOLOGY

## 2020-06-27 PROCEDURE — 72040 X-RAY EXAM NECK SPINE 2-3 VW: CPT | Mod: 26,,, | Performed by: RADIOLOGY

## 2020-06-27 PROCEDURE — 72040 X-RAY EXAM NECK SPINE 2-3 VW: CPT | Mod: TC,FY,PO

## 2020-07-01 DIAGNOSIS — S13.4XXA WHIPLASH INJURY TO NECK, INITIAL ENCOUNTER: ICD-10-CM

## 2020-07-01 RX ORDER — CYCLOBENZAPRINE HCL 10 MG
10 TABLET ORAL NIGHTLY PRN
Qty: 14 TABLET | Refills: 0 | Status: SHIPPED | OUTPATIENT
Start: 2020-07-01 | End: 2020-07-15

## 2020-07-02 ENCOUNTER — HOSPITAL ENCOUNTER (OUTPATIENT)
Dept: RADIOLOGY | Facility: HOSPITAL | Age: 55
Discharge: HOME | End: 2020-07-02
Attending: FAMILY MEDICINE
Payer: COMMERCIAL

## 2020-07-02 ENCOUNTER — OFFICE VISIT (OUTPATIENT)
Dept: FAMILY MEDICINE | Facility: CLINIC | Age: 55
End: 2020-07-02
Payer: COMMERCIAL

## 2020-07-02 VITALS
TEMPERATURE: 99 F | BODY MASS INDEX: 41.1 KG/M2 | HEART RATE: 82 BPM | SYSTOLIC BLOOD PRESSURE: 134 MMHG | OXYGEN SATURATION: 98 % | WEIGHT: 239.44 LBS | DIASTOLIC BLOOD PRESSURE: 74 MMHG

## 2020-07-02 DIAGNOSIS — Z12.39 BREAST CANCER SCREENING: ICD-10-CM

## 2020-07-02 DIAGNOSIS — R14.0 ABDOMINAL BLOATING: Primary | ICD-10-CM
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
## 2020-07-02 DIAGNOSIS — R07.81 RIB PAIN: ICD-10-CM

## 2020-07-02 DIAGNOSIS — M54.2 NECK PAIN: ICD-10-CM

## 2020-07-02 DIAGNOSIS — Z12.11 COLON CANCER SCREENING: ICD-10-CM

## 2020-07-02 PROCEDURE — 99213 PR OFFICE/OUTPT VISIT, EST, LEVL III, 20-29 MIN: ICD-10-PCS | Mod: S$GLB,,, | Performed by: FAMILY MEDICINE

## 2020-07-02 PROCEDURE — 71110 X-RAY EXAM RIBS BIL 3 VIEWS: CPT | Mod: TC,FY,PO

## 2020-07-02 PROCEDURE — 71110 X-RAY EXAM RIBS BIL 3 VIEWS: CPT | Mod: 26,,, | Performed by: RADIOLOGY

## 2020-07-02 PROCEDURE — 99999 PR PBB SHADOW E&M-EST. PATIENT-LVL V: ICD-10-PCS | Mod: PBBFAC,,, | Performed by: FAMILY MEDICINE

## 2020-07-02 PROCEDURE — 99999 PR PBB SHADOW E&M-EST. PATIENT-LVL V: CPT | Mod: PBBFAC,,, | Performed by: FAMILY MEDICINE

## 2020-07-02 PROCEDURE — 71110 XR RIBS 3 VIEWS BILATERAL: ICD-10-PCS | Mod: 26,,, | Performed by: RADIOLOGY

## 2020-07-02 PROCEDURE — 99213 OFFICE O/P EST LOW 20 MIN: CPT | Mod: S$GLB,,, | Performed by: FAMILY MEDICINE

## 2020-07-02 RX ORDER — HYDROCODONE BITARTRATE AND ACETAMINOPHEN 5; 325 MG/1; MG/1
1 TABLET ORAL EVERY 8 HOURS PRN
Qty: 21 TABLET | Refills: 0 | Status: SHIPPED | OUTPATIENT
Start: 2020-07-02 | End: 2020-07-09 | Stop reason: SDUPTHER

## 2020-07-02 NOTE — PATIENT INSTRUCTIONS
Eating Heart-Healthy Food: Using the DASH Plan    Eating for your heart doesnt have to be hard or boring. You just need to know how to make healthier choices. The DASH eating plan has been developed to help you do just that. DASH stands for Dietary Approaches to Stop Hypertension. It is a plan that has been proven to be healthier for your heart and to lower your risk for high blood pressure. It can also help lower your risk for cancer, heart disease, osteoporosis, and diabetes.  Choosing from each food group  Choose foods from each of the food groups below each day. Try to get the recommended number of servings for each food group. The serving numbers are based on a diet of 2,000 calories a day. Talk to your doctor if youre unsure about your calorie needs. Along with getting the correct servings, the DASH plan also recommends a sodium intake less than 2,300 mg per day.        Grains  Servings: 6 to 8 a day  A serving is:  · 1 slice bread  · 1 ounce dry cereal  · Half a cup cooked rice, pasta or cereal  Best choices: Whole grains and any grains high in fiber. Vegetables  Servings: 4 to 5 a day  A serving is:  · 1 cup raw leafy vegetable  · Half a cup cut-up raw or cooked vegetable  · Half a cup vegetable juice  Best choices: Fresh or frozen vegetables prepared without added salt or fat.   Fruits  Servings: 4 to 5 a day  A serving is:  · 1 medium fruit  · One-quarter cup dried fruit  · Half a cup fresh, frozen, or canned fruit  · Half a cup of 100% fruit juices  Best choices: A variety of fresh fruits of different colors. Whole fruits are a better choice than fruit juices. Low-fat or fat-free dairy  Servings: 2 to 3 a day  A serving is:  · 1 cup milk  · 1 cup yogurt  · One and a half ounces cheese  Best choices: Skim or 1% milk, low-fat or fat-free yogurt or buttermilk, and low-fat cheeses.         Lean meats, poultry, fish  Servings: 6 or fewer a day  A serving is:  · 1 ounce cooked meats, poultry, or fish  · 1  egg  Best choices: Lean poultry and fish. Trim away visible fat. Broil, grill, roast, or boil instead of frying. Remove skin from poultry before eating. Limit how much red meat you eat.  Nuts, seeds, beans  Servings: 4 to 5 a week  A serving is:  · One-third cup nuts (one and a half ounces)  · 2 tablespoons nut butter or seeds  · Half a cup cooked dry beans or legumes  Best choices: Dry roasted nuts with no salt added, lentils, kidney beans, garbanzo beans, and whole hackett beans.   Fats and oils  Servings: 2 to 3 a day  A serving is:  · 1 teaspoon vegetable oil  · 1 teaspoon soft margarine  · 1 tablespoon mayonnaise  · 2 tablespoons salad dressing  Best choices: Nut and vegetable oils (nontropical vegetable oils), such as olive and canola oil. Sweets  Servings: 5 a week or fewer  A serving is:  · 1 tablespoon sugar, maple syrup, or honey  · 1 tablespoon jam or jelly  · 1 half-ounce jelly beans (about 15)  · 1 cup lemonade  Best choices: Dried fruit can be a satisfying sweet. Choose low-fat sweets. And watch your serving sizes!      For more on the DASH eating plan, visit:  www.nhlbi.nih.gov/health/health-topics/topics/dash   Date Last Reviewed: 6/1/2016  © 1320-0237 [employer]. [address] [place] [address], [place] [address]. All rights reserved. This information is not intended as a substitute for professional medical care. Always follow your healthcare professional's instructions.
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
## 2020-07-05 DIAGNOSIS — R74.8 INCREASED LIVER ENZYMES: Primary | ICD-10-CM

## 2020-07-09 DIAGNOSIS — R07.81 RIB PAIN: ICD-10-CM
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
## 2020-07-09 DIAGNOSIS — M54.2 NECK PAIN: ICD-10-CM

## 2020-07-09 RX ORDER — HYDROCODONE BITARTRATE AND ACETAMINOPHEN 5; 325 MG/1; MG/1
1 TABLET ORAL EVERY 8 HOURS PRN
Qty: 21 TABLET | Refills: 0 | Status: CANCELLED | OUTPATIENT
Start: 2020-07-09 | End: 2020-07-16

## 2020-07-09 RX ORDER — HYDROCODONE BITARTRATE AND ACETAMINOPHEN 5; 325 MG/1; MG/1
1 TABLET ORAL EVERY 8 HOURS PRN
Qty: 21 TABLET | Refills: 0 | Status: SHIPPED | OUTPATIENT
Start: 2020-07-09 | End: 2020-07-16

## 2020-07-20 RX ORDER — CYCLOBENZAPRINE HCL 10 MG
10 TABLET ORAL NIGHTLY PRN
Qty: 30 TABLET | Refills: 0 | Status: CANCELLED | OUTPATIENT
Start: 2020-07-20 | End: 2020-07-30

## 2020-07-20 RX ORDER — HYDROCODONE BITARTRATE AND ACETAMINOPHEN 5; 325 MG/1; MG/1
1 TABLET ORAL EVERY 8 HOURS PRN
Qty: 30 TABLET | Refills: 0 | Status: CANCELLED | OUTPATIENT
Start: 2020-07-20
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
## 2020-07-20 NOTE — TELEPHONE ENCOUNTER
Please make her an appointment for a follow-up, seems like she still needs Norco and I gave her 2 different rounds of the medication.  Please make her an appointment for this week if possible, if not appointment with me, make her with Bernice.  Thank you

## 2020-07-21 RX ORDER — HYDROCODONE BITARTRATE AND ACETAMINOPHEN 5; 325 MG/1; MG/1
1 TABLET ORAL EVERY 6 HOURS PRN
Refills: 0 | OUTPATIENT
Start: 2020-07-21

## 2020-07-21 RX ORDER — CYCLOBENZAPRINE HCL 10 MG
10 TABLET ORAL 3 TIMES DAILY PRN
OUTPATIENT
Start: 2020-07-21 | End: 2020-07-31

## 2020-07-21 NOTE — TELEPHONE ENCOUNTER
----- Message from Teresa Rivera sent at 7/21/2020 11:28 AM CDT -----  Regarding: refill  Contact: patient  Type:  RX Refill Request    Who Called:  Patient  Refill or New Rx:  Refills (2)    1.  RX Name and Strength:  Flexeril 10mg (not in chart)  How is the patient currently taking it? (ex. 1XDay):  2XDay  Is this a 30 day or 90 day RX: 30    2.  RX Name and Strength:  Hydrocodone 5-325 mg (not in chart)  How is the patient currently taking it? (ex. 1XDay): 3XDay  Is this a 30 day or 90 day RX:  30    Preferred Pharmacy with phone number:  [id number] - [place] - [address] [facility] [phone] (Phone)   Local or Mail Order:  Local  Ordering Provider:   Best Call Back Number:  [phone]- requesting call back  Additional Information:

## 2020-07-21 NOTE — TELEPHONE ENCOUNTER
Please schedule an appointment for tomorrow.  This medication hydrocodone was only short-term and she still having a lot of pain, she has been using 42 tablets in less than 3 weeks.  She has an appointment  for a follow-up in August but you can reschedule for tomorrow to re-evaluate and change medications.  Thank you

## 2020-07-23 ENCOUNTER — TELEPHONE (OUTPATIENT)
Dept: FAMILY MEDICINE | Facility: CLINIC | Age: 55
End: 2020-07-23

## 2020-07-23 RX ORDER — CYCLOBENZAPRINE HCL 10 MG
10 TABLET ORAL NIGHTLY PRN
Qty: 10 TABLET | Refills: 0 | Status: SHIPPED | OUTPATIENT
Start: 2020-07-23 | End: 2020-08-02

## 2020-07-23 NOTE — TELEPHONE ENCOUNTER
Probably will have to examine her, she has an appointment in August, I can check at that time, if she wants to schedule earlier will be better.  I will send a prescription for Flexeril to the pharmacy.  Please tell the patient the ibuprofen 800 mg is only 1 at the time because can cause problems with her kidneys.  And make sure to drink plenty of water.  Thank you

## 2020-07-23 NOTE — TELEPHONE ENCOUNTER
----- Message from Gisel Sifuentes sent at 7/23/2020  9:52 AM CDT -----  Regarding: return call about coming in tomorrow  Contact: Lori pimentel  Type: Needs Medical Advice  Who Called:  Lori  Best Call Back Number: [phone]  Additional Information: Pls call pt regarding a visit for tomorrow

## 2020-07-23 NOTE — TELEPHONE ENCOUNTER
Patient stating she does not need the Hydrocodone Rx anymore the pain is not bad as it was. Wanted the Flexeril refill, advised an appt is needed to re-evaluate and discuss medications. Offered a sooner appt for tomorrow, Friday July 24th but declined. She is also asking if she can receive a prescription for Motrin liquid gel capsules 800mg, took 2 this morning for pain. Stating she is still sore, but not as bad as it was when she originally came in. Still hurt when she bends over.     Also asking is it normal to have pins and needles by her breast rib area? Stating it only hurt when she touches it. When she takes a shower, picks up things, do everyday things. Please advise

## 2020-07-29 ENCOUNTER — TELEPHONE (OUTPATIENT)
Dept: FAMILY MEDICINE | Facility: CLINIC | Age: 55
End: 2020-07-29

## 2020-07-29 RX ORDER — IBUPROFEN 800 MG/1
800 TABLET ORAL EVERY 8 HOURS PRN
Qty: 30 TABLET | Refills: 0 | Status: SHIPPED | OUTPATIENT
Start: 2020-07-29 | End: 2020-08-08

## 2020-07-29 NOTE — TELEPHONE ENCOUNTER
----- Message from Osirisfidel Del Angel sent at 7/28/2020 12:46 PM CDT -----  Regarding: Rx  Contact: pt  Type: Needs Medical Advice    Who Called:      Best Call Back Number:     Additional Information: Requesting a call back from Nurse, regarding pt needs a Rx for Judge 800mg per pharmacy request is it not over the counter ,please call back with advice

## 2020-08-03 ENCOUNTER — OFFICE VISIT (OUTPATIENT)
Dept: FAMILY MEDICINE | Facility: CLINIC | Age: 55
End: 2020-08-03
Payer: COMMERCIAL

## 2020-08-03 VITALS
BODY MASS INDEX: 42.27 KG/M2 | TEMPERATURE: 98 F | WEIGHT: 246.25 LBS | OXYGEN SATURATION: 98 % | HEART RATE: 91 BPM | SYSTOLIC BLOOD PRESSURE: 136 MMHG | DIASTOLIC BLOOD PRESSURE: 74 MMHG
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
## 2020-08-03 DIAGNOSIS — K59.09 OTHER CONSTIPATION: ICD-10-CM

## 2020-08-03 DIAGNOSIS — I10 ESSENTIAL HYPERTENSION: ICD-10-CM

## 2020-08-03 DIAGNOSIS — R14.0 ABDOMINAL BLOATING: ICD-10-CM

## 2020-08-03 DIAGNOSIS — R07.81 RIB PAIN ON LEFT SIDE: ICD-10-CM

## 2020-08-03 DIAGNOSIS — E66.01 CLASS 3 SEVERE OBESITY DUE TO EXCESS CALORIES WITH SERIOUS COMORBIDITY AND BODY MASS INDEX (BMI) OF 40.0 TO 44.9 IN ADULT: ICD-10-CM

## 2020-08-03 DIAGNOSIS — R60.0 LOCALIZED EDEMA: ICD-10-CM

## 2020-08-03 PROCEDURE — 99214 OFFICE O/P EST MOD 30 MIN: CPT | Mod: S$GLB,,, | Performed by: FAMILY MEDICINE

## 2020-08-03 PROCEDURE — 99999 PR PBB SHADOW E&M-EST. PATIENT-LVL IV: CPT | Mod: PBBFAC,,, | Performed by: FAMILY MEDICINE

## 2020-08-03 PROCEDURE — 99214 PR OFFICE/OUTPT VISIT, EST, LEVL IV, 30-39 MIN: ICD-10-PCS | Mod: S$GLB,,, | Performed by: FAMILY MEDICINE

## 2020-08-03 PROCEDURE — 99999 PR PBB SHADOW E&M-EST. PATIENT-LVL IV: ICD-10-PCS | Mod: PBBFAC,,, | Performed by: FAMILY MEDICINE

## 2020-08-03 RX ORDER — HYDROCHLOROTHIAZIDE 12.5 MG/1
12.5 TABLET ORAL DAILY
Qty: 30 TABLET | Refills: 11 | Status: SHIPPED | OUTPATIENT
Start: 2020-08-03 | End: 2021-05-11

## 2020-08-03 RX ORDER — POLYETHYLENE GLYCOL 3350 17 G/17G
17 POWDER, FOR SOLUTION ORAL DAILY
Qty: 850 G | Refills: 0 | Status: SHIPPED | OUTPATIENT
Start: 2020-08-03

## 2020-08-03 RX ORDER — AMLODIPINE AND BENAZEPRIL HYDROCHLORIDE 5; 10 MG/1; MG/1
1 CAPSULE ORAL DAILY
Qty: 90 CAPSULE | Refills: 0 | Status: SHIPPED | OUTPATIENT
Start: 2020-08-03 | End: 2020-09-03

## 2020-08-03 NOTE — PATIENT INSTRUCTIONS
Eating Heart-Healthy Food: Using the DASH Plan    Eating for your heart doesnt have to be hard or boring. You just need to know how to make healthier choices. The DASH eating plan has been developed to help you do just that. DASH stands for Dietary Approaches to Stop Hypertension. It is a plan that has been proven to be healthier for your heart and to lower your risk for high blood pressure. It can also help lower your risk for cancer, heart disease, osteoporosis, and diabetes.  Choosing from each food group  Choose foods from each of the food groups below each day. Try to get the recommended number of servings for each food group. The serving numbers are based on a diet of 2,000 calories a day. Talk to your doctor if youre unsure about your calorie needs. Along with getting the correct servings, the DASH plan also recommends a sodium intake less than 2,300 mg per day.        Grains  Servings: 6 to 8 a day  A serving is:  · 1 slice bread  · 1 ounce dry cereal  · Half a cup cooked rice, pasta or cereal  Best choices: Whole grains and any grains high in fiber. Vegetables  Servings: 4 to 5 a day  A serving is:  · 1 cup raw leafy vegetable  · Half a cup cut-up raw or cooked vegetable  · Half a cup vegetable juice  Best choices: Fresh or frozen vegetables prepared without added salt or fat.   Fruits  Servings: 4 to 5 a day  A serving is:  · 1 medium fruit  · One-quarter cup dried fruit  · Half a cup fresh, frozen, or canned fruit  · Half a cup of 100% fruit juices  Best choices: A variety of fresh fruits of different colors. Whole fruits are a better choice than fruit juices. Low-fat or fat-free dairy  Servings: 2 to 3 a day  A serving is:  · 1 cup milk  · 1 cup yogurt  · One and a half ounces cheese  Best choices: Skim or 1% milk, low-fat or fat-free yogurt or buttermilk, and low-fat cheeses.         Lean meats, poultry, fish  Servings: 6 or fewer a day  A serving is:  · 1 ounce cooked meats, poultry, or fish  · 1  egg  Best choices: Lean poultry and fish. Trim away visible fat. Broil, grill, roast, or boil instead of frying. Remove skin from poultry before eating. Limit how much red meat you eat.  Nuts, seeds, beans  Servings: 4 to 5 a week  A serving is:  · One-third cup nuts (one and a half ounces)  · 2 tablespoons nut butter or seeds  · Half a cup cooked dry beans or legumes  Best choices: Dry roasted nuts with no salt added, lentils, kidney beans, garbanzo beans, and whole hackett beans.   Fats and oils  Servings: 2 to 3 a day  A serving is:  · 1 teaspoon vegetable oil  · 1 teaspoon soft margarine  · 1 tablespoon mayonnaise  · 2 tablespoons salad dressing  Best choices: Nut and vegetable oils (nontropical vegetable oils), such as olive and canola oil. Sweets  Servings: 5 a week or fewer  A serving is:  · 1 tablespoon sugar, maple syrup, or honey  · 1 tablespoon jam or jelly  · 1 half-ounce jelly beans (about 15)  · 1 cup lemonade  Best choices: Dried fruit can be a satisfying sweet. Choose low-fat sweets. And watch your serving sizes!      For more on the DASH eating plan, visit:  www.nhlbi.nih.gov/health/health-topics/topics/dash   Date Last Reviewed: 6/1/2016  © 8977-3711 [employer] [address] [place] [address], [place] [address]. All rights reserved. This information is not intended as a substitute for professional medical care. Always follow your healthcare professional's instructions.
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
## 2020-09-03 ENCOUNTER — LAB VISIT (OUTPATIENT)
Dept: LAB | Facility: HOSPITAL | Age: 55
End: 2020-09-03
Attending: PHYSICIAN ASSISTANT
Payer: COMMERCIAL

## 2020-09-03 ENCOUNTER — OFFICE VISIT (OUTPATIENT)
Dept: FAMILY MEDICINE | Facility: CLINIC | Age: 55
End: 2020-09-03
Payer: COMMERCIAL

## 2020-09-03 VITALS
WEIGHT: 244.69 LBS | SYSTOLIC BLOOD PRESSURE: 126 MMHG | TEMPERATURE: 98 F | OXYGEN SATURATION: 97 % | DIASTOLIC BLOOD PRESSURE: 72 MMHG | HEART RATE: 85 BPM | BODY MASS INDEX: 42 KG/M2

## 2020-09-03 DIAGNOSIS — E61.1 IRON DEFICIENCY: ICD-10-CM

## 2020-09-03 DIAGNOSIS — S22.42XD CLOSED FRACTURE OF MULTIPLE RIBS OF LEFT SIDE WITH ROUTINE HEALING, SUBSEQUENT ENCOUNTER: Primary | ICD-10-CM

## 2020-09-03 DIAGNOSIS — I10 ESSENTIAL HYPERTENSION: ICD-10-CM

## 2020-09-03 DIAGNOSIS — E66.01 CLASS 3 SEVERE OBESITY DUE TO EXCESS CALORIES WITH SERIOUS COMORBIDITY AND BODY MASS INDEX (BMI) OF 40.0 TO 44.9 IN ADULT: ICD-10-CM

## 2020-09-03 DIAGNOSIS — E53.8 B12 DEFICIENCY: ICD-10-CM

## 2020-09-03 PROCEDURE — 99214 OFFICE O/P EST MOD 30 MIN: CPT | Mod: S$GLB,,, | Performed by: PHYSICIAN ASSISTANT

## 2020-09-03 PROCEDURE — 82607 VITAMIN B-12: CPT

## 2020-09-03 PROCEDURE — 83540 ASSAY OF IRON: CPT

## 2020-09-03 PROCEDURE — 99214 PR OFFICE/OUTPT VISIT, EST, LEVL IV, 30-39 MIN: ICD-10-PCS | Mod: S$GLB,,, | Performed by: PHYSICIAN ASSISTANT

## 2020-09-03 PROCEDURE — 99999 PR PBB SHADOW E&M-EST. PATIENT-LVL III: CPT | Mod: PBBFAC,,, | Performed by: PHYSICIAN ASSISTANT

## 2020-09-03 PROCEDURE — 36415 COLL VENOUS BLD VENIPUNCTURE: CPT | Mod: PO

## 2020-09-03 PROCEDURE — 99999 PR PBB SHADOW E&M-EST. PATIENT-LVL III: ICD-10-PCS | Mod: PBBFAC,,, | Performed by: PHYSICIAN ASSISTANT

## 2020-09-03 RX ORDER — CYANOCOBALAMIN 1000 UG/ML
1000 INJECTION, SOLUTION INTRAMUSCULAR; SUBCUTANEOUS
Qty: 30 ML | Refills: 11 | Status: SHIPPED | OUTPATIENT
Start: 2020-09-03 | End: 2020-09-09 | Stop reason: ALTCHOICE

## 2020-09-03 RX ORDER — IBUPROFEN 800 MG/1
800 TABLET ORAL EVERY 6 HOURS PRN
Qty: 30 TABLET | Refills: 0 | Status: SHIPPED | OUTPATIENT
Start: 2020-09-03 | End: 2022-09-28

## 2020-09-03 RX ORDER — AMLODIPINE AND BENAZEPRIL HYDROCHLORIDE 5; 10 MG/1; MG/1
1 CAPSULE ORAL DAILY
Qty: 90 CAPSULE | Refills: 3 | Status: SHIPPED | OUTPATIENT
Start: 2020-09-03 | End: 2021-11-01
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
## 2020-09-04 LAB
IRON SERPL-MCNC: 104 UG/DL (ref 30–160)
SATURATED IRON: 31 % (ref 20–50)
TOTAL IRON BINDING CAPACITY: 340 UG/DL (ref 250–450)
TRANSFERRIN SERPL-MCNC: 230 MG/DL (ref 200–375)
VIT B12 SERPL-MCNC: 1621 PG/ML (ref 210–950)

## 2020-09-10 LAB
HUMAN PAPILLOMAVIRUS (HPV): NORMAL
PAP RECOMMENDATION EXT: NORMAL
PAP SMEAR: NORMAL

## 2020-09-16 DIAGNOSIS — E53.8 B12 DEFICIENCY: Primary | ICD-10-CM

## 2020-09-16 RX ORDER — MECOBALAMIN 10000 MCG
1000 VIAL (EA) INJECTION WEEKLY
Qty: 5 EACH | Refills: 11 | Status: SHIPPED | OUTPATIENT
Start: 2020-09-16

## 2020-09-16 RX ORDER — CYANOCOBALAMIN 1000 UG/ML
1000 INJECTION, SOLUTION INTRAMUSCULAR; SUBCUTANEOUS
Qty: 30 ML | Refills: 11 | Status: CANCELLED | OUTPATIENT
Start: 2020-09-16

## 2020-09-16 NOTE — TELEPHONE ENCOUNTER
----- Message from Yvette Swift sent at 9/16/2020 11:43 AM CDT -----  Contact: Naga Guzman  Patient need a refill on B12 injection please send to [facility]othe[facility], any questions please call back at [phone] (home)       Case number [id number]  [facility] - [place] - [address]  [address] [place] [address]  Phone: [phone] Fax: [phone]

## 2020-10-05 ENCOUNTER — PATIENT MESSAGE (OUTPATIENT)
Dept: ADMINISTRATIVE | Facility: HOSPITAL | Age: 55
End: 2020-10-05

## 2020-10-30 ENCOUNTER — PATIENT MESSAGE (OUTPATIENT)
Dept: ADMINISTRATIVE | Facility: HOSPITAL | Age: 55
End: 2020-10-30

## 2020-11-20 ENCOUNTER — PATIENT OUTREACH (OUTPATIENT)
Dept: ADMINISTRATIVE | Facility: HOSPITAL | Age: 55
End: 2020-11-20

## 2020-11-20 NOTE — PROGRESS NOTES
11/20/2020   LINKS DOWN 11/20/2020    [facility], and [facility] reviewed   Pap Smear and Lab testing       DIS reviewed      Mammogram and DEXA          Health Maintenance Due   Topic Date Due    HIV Screening  08/08/1982    TETANUS VACCINE  08/08/1985    Pneumococcal Vaccine (Medium Risk) (1 of 1 - PPSV23) 08/08/1986    Cervical Cancer Screening  08/08/1988    Mammogram  10/15/2014    Shingles Vaccine (1 of 2) 08/08/2017    Colorectal Cancer Screening  08/08/2017    Influenza Vaccine (1) 08/01/2020

## 2021-01-04 ENCOUNTER — PATIENT MESSAGE (OUTPATIENT)
Dept: ADMINISTRATIVE | Facility: HOSPITAL | Age: 56
End: 2021-01-04

## 2021-01-21 ENCOUNTER — PATIENT OUTREACH (OUTPATIENT)
Dept: ADMINISTRATIVE | Facility: HOSPITAL | Age: 56
End: 2021-01-21

## 2021-02-05 ENCOUNTER — PATIENT OUTREACH (OUTPATIENT)
Dept: ADMINISTRATIVE | Facility: HOSPITAL | Age: 56
End: 2021-02-05

## 2021-03-04 ENCOUNTER — OFFICE VISIT (OUTPATIENT)
Dept: FAMILY MEDICINE | Facility: CLINIC | Age: 56
End: 2021-03-04
Payer: COMMERCIAL

## 2021-03-04 VITALS
DIASTOLIC BLOOD PRESSURE: 78 MMHG | WEIGHT: 250.88 LBS | TEMPERATURE: 99 F | HEIGHT: 64 IN | SYSTOLIC BLOOD PRESSURE: 132 MMHG | OXYGEN SATURATION: 98 % | BODY MASS INDEX: 42.83 KG/M2 | HEART RATE: 91 BPM

## 2021-03-04 DIAGNOSIS — R74.8 INCREASED LIVER ENZYMES: ICD-10-CM

## 2021-03-04 DIAGNOSIS — I10 ESSENTIAL HYPERTENSION: Primary | ICD-10-CM

## 2021-03-04 DIAGNOSIS — Z12.31 ENCOUNTER FOR SCREENING MAMMOGRAM FOR MALIGNANT NEOPLASM OF BREAST: ICD-10-CM

## 2021-03-04 DIAGNOSIS — R07.81 RIB PAIN ON LEFT SIDE: ICD-10-CM

## 2021-03-04 DIAGNOSIS — Z12.11 SCREENING FOR COLON CANCER: ICD-10-CM

## 2021-03-04 DIAGNOSIS — B37.9 YEAST INFECTION: ICD-10-CM

## 2021-03-04 PROCEDURE — 99999 PR PBB SHADOW E&M-EST. PATIENT-LVL IV: CPT | Mod: PBBFAC,,, | Performed by: FAMILY MEDICINE

## 2021-03-04 PROCEDURE — 99214 OFFICE O/P EST MOD 30 MIN: CPT | Mod: S$GLB,,, | Performed by: FAMILY MEDICINE

## 2021-03-04 PROCEDURE — 99214 PR OFFICE/OUTPT VISIT, EST, LEVL IV, 30-39 MIN: ICD-10-PCS | Mod: S$GLB,,, | Performed by: FAMILY MEDICINE

## 2021-03-04 PROCEDURE — 99999 PR PBB SHADOW E&M-EST. PATIENT-LVL IV: ICD-10-PCS | Mod: PBBFAC,,, | Performed by: FAMILY MEDICINE

## 2021-03-04 RX ORDER — FLUCONAZOLE 150 MG/1
TABLET ORAL
Qty: 2 TABLET | Refills: 2 | Status: SHIPPED | OUTPATIENT
Start: 2021-03-04 | End: 2022-09-28

## 2021-04-06 ENCOUNTER — PATIENT MESSAGE (OUTPATIENT)
Dept: ADMINISTRATIVE | Facility: HOSPITAL | Age: 56
End: 2021-04-06

## 2021-05-06 ENCOUNTER — PATIENT MESSAGE (OUTPATIENT)
Dept: RESEARCH | Facility: HOSPITAL | Age: 56
End: 2021-05-06

## 2021-05-09 DIAGNOSIS — R60.0 LOCALIZED EDEMA: ICD-10-CM

## 2021-05-12 RX ORDER — HYDROCHLOROTHIAZIDE 12.5 MG/1
12.5 TABLET ORAL DAILY
Qty: 90 TABLET | Refills: 0 | Status: SHIPPED | OUTPATIENT
Start: 2021-05-12 | End: 2021-10-18

## 2021-05-21 ENCOUNTER — PATIENT OUTREACH (OUTPATIENT)
Dept: ADMINISTRATIVE | Facility: HOSPITAL | Age: 56
End: 2021-05-21

## 2021-06-09 ENCOUNTER — PATIENT OUTREACH (OUTPATIENT)
Dept: ADMINISTRATIVE | Facility: HOSPITAL | Age: 56
End: 2021-06-09

## 2021-07-07 ENCOUNTER — PATIENT MESSAGE (OUTPATIENT)
Dept: ADMINISTRATIVE | Facility: HOSPITAL | Age: 56
End: 2021-07-07

## 2021-10-17 DIAGNOSIS — R60.0 LOCALIZED EDEMA: ICD-10-CM

## 2021-10-18 RX ORDER — HYDROCHLOROTHIAZIDE 12.5 MG/1
TABLET ORAL
Qty: 90 TABLET | Refills: 0 | Status: SHIPPED | OUTPATIENT
Start: 2021-10-18 | End: 2022-01-23

## 2021-11-09 ENCOUNTER — PATIENT OUTREACH (OUTPATIENT)
Dept: ADMINISTRATIVE | Facility: HOSPITAL | Age: 56
End: 2021-11-09

## 2022-01-21 DIAGNOSIS — R60.0 LOCALIZED EDEMA: ICD-10-CM

## 2022-01-21 NOTE — TELEPHONE ENCOUNTER
No new care gaps identified.  Powered by [employer]. Reference number: [id number].   1/21/2022 9:08:06 AM CST

## 2022-01-23 RX ORDER — HYDROCHLOROTHIAZIDE 12.5 MG/1
TABLET ORAL
Qty: 90 TABLET | Refills: 0 | Status: SHIPPED | OUTPATIENT
Start: 2022-01-23 | End: 2022-03-30

## 2022-03-28 DIAGNOSIS — R60.0 LOCALIZED EDEMA: ICD-10-CM

## 2022-03-28 NOTE — TELEPHONE ENCOUNTER
No new care gaps identified.  Powered by [employer]. Reference number: [id number].   3/28/2022 10:32:53 AM CDT

## 2022-03-30 RX ORDER — HYDROCHLOROTHIAZIDE 12.5 MG/1
TABLET ORAL
Qty: 30 TABLET | Refills: 2 | Status: SHIPPED | OUTPATIENT
Start: 2022-03-30 | End: 2022-06-28

## 2022-03-30 NOTE — TELEPHONE ENCOUNTER
This Rx Request does not qualify for assessment with the OR   Please review protocol details and the Care Due Message for extra clinical information    Reasons Rx Request may be deferred:  Labs/Vitals overdue    Note composed:10:14 AM 03/30/2022

## 2022-05-05 DIAGNOSIS — Z12.31 OTHER SCREENING MAMMOGRAM: ICD-10-CM

## 2022-05-09 ENCOUNTER — PATIENT MESSAGE (OUTPATIENT)
Dept: ADMINISTRATIVE | Facility: HOSPITAL | Age: 57
End: 2022-05-09
Payer: COMMERCIAL

## 2022-05-31 ENCOUNTER — PATIENT MESSAGE (OUTPATIENT)
Dept: ADMINISTRATIVE | Facility: HOSPITAL | Age: 57
End: 2022-05-31
Payer: COMMERCIAL

## 2022-05-31 DIAGNOSIS — I10 ESSENTIAL HYPERTENSION: ICD-10-CM

## 2022-05-31 RX ORDER — AMLODIPINE AND BENAZEPRIL HYDROCHLORIDE 5; 10 MG/1; MG/1
CAPSULE ORAL
Qty: 90 CAPSULE | OUTPATIENT
Start: 2022-05-31

## 2022-05-31 NOTE — TELEPHONE ENCOUNTER
No new care gaps identified.  [facility] Embedded Care Gaps. Reference number: [id number]. 5/31/2022   12:16:29 AM CDT

## 2022-05-31 NOTE — TELEPHONE ENCOUNTER
Called pt, no answer.LVM notifying pt she is due for an appointment .She has not been seen by  in over a year.

## 2022-07-27 DIAGNOSIS — Z12.11 COLON CANCER SCREENING: ICD-10-CM

## 2022-08-01 ENCOUNTER — PATIENT MESSAGE (OUTPATIENT)
Dept: ADMINISTRATIVE | Facility: HOSPITAL | Age: 57
End: 2022-08-01
Payer: COMMERCIAL

## 2022-08-02 ENCOUNTER — PATIENT OUTREACH (OUTPATIENT)
Dept: ADMINISTRATIVE | Facility: HOSPITAL | Age: 57
End: 2022-08-02
Payer: COMMERCIAL

## 2022-08-02 ENCOUNTER — PATIENT MESSAGE (OUTPATIENT)
Dept: ADMINISTRATIVE | Facility: HOSPITAL | Age: 57
End: 2022-08-02
Payer: COMMERCIAL

## 2022-08-19 ENCOUNTER — PATIENT MESSAGE (OUTPATIENT)
Dept: ADMINISTRATIVE | Facility: HOSPITAL | Age: 57
End: 2022-08-19
Payer: COMMERCIAL

## 2022-08-24 ENCOUNTER — PATIENT MESSAGE (OUTPATIENT)
Dept: ADMINISTRATIVE | Facility: HOSPITAL | Age: 57
End: 2022-08-24
Payer: COMMERCIAL

## 2022-09-14 DIAGNOSIS — I10 HTN (HYPERTENSION): ICD-10-CM

## 2022-09-28 ENCOUNTER — PATIENT MESSAGE (OUTPATIENT)
Dept: FAMILY MEDICINE | Facility: CLINIC | Age: 57
End: 2022-09-28

## 2022-09-28 ENCOUNTER — PATIENT OUTREACH (OUTPATIENT)
Dept: ADMINISTRATIVE | Facility: HOSPITAL | Age: 57
End: 2022-09-28
Payer: COMMERCIAL

## 2022-09-28 ENCOUNTER — HOSPITAL ENCOUNTER (OUTPATIENT)
Dept: RADIOLOGY | Facility: HOSPITAL | Age: 57
Discharge: HOME | End: 2022-09-28
Attending: FAMILY MEDICINE
Payer: COMMERCIAL

## 2022-09-28 ENCOUNTER — OFFICE VISIT (OUTPATIENT)
Dept: FAMILY MEDICINE | Facility: CLINIC | Age: 57
End: 2022-09-28
Payer: COMMERCIAL

## 2022-09-28 VITALS
OXYGEN SATURATION: 98 % | RESPIRATION RATE: 18 BRPM | HEIGHT: 64 IN | HEART RATE: 81 BPM | WEIGHT: 226.63 LBS | DIASTOLIC BLOOD PRESSURE: 82 MMHG | SYSTOLIC BLOOD PRESSURE: 124 MMHG | BODY MASS INDEX: 38.69 KG/M2

## 2022-09-28 DIAGNOSIS — R60.0 LOCALIZED EDEMA: ICD-10-CM

## 2022-09-28 DIAGNOSIS — J30.89 NON-SEASONAL ALLERGIC RHINITIS DUE TO OTHER ALLERGIC TRIGGER: ICD-10-CM

## 2022-09-28 DIAGNOSIS — M79.89 SWELLING OF BOTH HANDS: ICD-10-CM

## 2022-09-28 DIAGNOSIS — R07.81 RIB PAIN: ICD-10-CM

## 2022-09-28 DIAGNOSIS — S09.90XA INJURY OF HEAD, INITIAL ENCOUNTER: ICD-10-CM

## 2022-09-28 DIAGNOSIS — B37.9 YEAST INFECTION: ICD-10-CM

## 2022-09-28 DIAGNOSIS — I10 ESSENTIAL HYPERTENSION: ICD-10-CM

## 2022-09-28 DIAGNOSIS — R20.0 NUMBNESS: ICD-10-CM

## 2022-09-28 DIAGNOSIS — L91.8 SKIN TAGS, MULTIPLE ACQUIRED: ICD-10-CM

## 2022-09-28 DIAGNOSIS — Z00.01 ANNUAL VISIT FOR GENERAL ADULT MEDICAL EXAMINATION WITH ABNORMAL FINDINGS: Primary | ICD-10-CM

## 2022-09-28 DIAGNOSIS — L98.9 SKIN LESION: ICD-10-CM

## 2022-09-28 PROCEDURE — 99396 PREV VISIT EST AGE 40-64: CPT | Mod: S$GLB,,, | Performed by: FAMILY MEDICINE

## 2022-09-28 PROCEDURE — 71100 X-RAY EXAM RIBS UNI 2 VIEWS: CPT | Mod: TC,FY,PO,LT

## 2022-09-28 PROCEDURE — 3079F PR MOST RECENT DIASTOLIC BLOOD PRESSURE 80-89 MM HG: ICD-10-PCS | Mod: CPTII,S$GLB,, | Performed by: FAMILY MEDICINE

## 2022-09-28 PROCEDURE — 3008F PR BODY MASS INDEX (BMI) DOCUMENTED: ICD-10-PCS | Mod: CPTII,S$GLB,, | Performed by: FAMILY MEDICINE

## 2022-09-28 PROCEDURE — 99999 PR PBB SHADOW E&M-EST. PATIENT-LVL IV: ICD-10-PCS | Mod: PBBFAC,,, | Performed by: FAMILY MEDICINE

## 2022-09-28 PROCEDURE — 99396 PR PREVENTIVE VISIT,EST,40-64: ICD-10-PCS | Mod: S$GLB,,, | Performed by: FAMILY MEDICINE

## 2022-09-28 PROCEDURE — 3008F BODY MASS INDEX DOCD: CPT | Mod: CPTII,S$GLB,, | Performed by: FAMILY MEDICINE

## 2022-09-28 PROCEDURE — 71100 X-RAY EXAM RIBS UNI 2 VIEWS: CPT | Mod: 26,LT,, | Performed by: RADIOLOGY

## 2022-09-28 PROCEDURE — 3074F SYST BP LT 130 MM HG: CPT | Mod: CPTII,S$GLB,, | Performed by: FAMILY MEDICINE

## 2022-09-28 PROCEDURE — 1159F MED LIST DOCD IN RCRD: CPT | Mod: CPTII,S$GLB,, | Performed by: FAMILY MEDICINE

## 2022-09-28 PROCEDURE — 3074F PR MOST RECENT SYSTOLIC BLOOD PRESSURE < 130 MM HG: ICD-10-PCS | Mod: CPTII,S$GLB,, | Performed by: FAMILY MEDICINE

## 2022-09-28 PROCEDURE — 4010F ACE/ARB THERAPY RXD/TAKEN: CPT | Mod: CPTII,S$GLB,, | Performed by: FAMILY MEDICINE

## 2022-09-28 PROCEDURE — 1159F PR MEDICATION LIST DOCUMENTED IN MEDICAL RECORD: ICD-10-PCS | Mod: CPTII,S$GLB,, | Performed by: FAMILY MEDICINE

## 2022-09-28 PROCEDURE — 3079F DIAST BP 80-89 MM HG: CPT | Mod: CPTII,S$GLB,, | Performed by: FAMILY MEDICINE

## 2022-09-28 PROCEDURE — 4010F PR ACE/ARB THEARPY RXD/TAKEN: ICD-10-PCS | Mod: CPTII,S$GLB,, | Performed by: FAMILY MEDICINE

## 2022-09-28 PROCEDURE — 99999 PR PBB SHADOW E&M-EST. PATIENT-LVL IV: CPT | Mod: PBBFAC,,, | Performed by: FAMILY MEDICINE

## 2022-09-28 PROCEDURE — 71100 XR RIBS 2 VIEW LEFT: ICD-10-PCS | Mod: 26,LT,, | Performed by: RADIOLOGY

## 2022-09-28 RX ORDER — ALBUTEROL SULFATE 90 UG/1
4 AEROSOL, METERED RESPIRATORY (INHALATION) EVERY 6 HOURS PRN
Qty: 18 G | Refills: 1 | Status: SHIPPED | OUTPATIENT
Start: 2022-09-28 | End: 2023-02-28 | Stop reason: SDUPTHER

## 2022-09-28 RX ORDER — FLUCONAZOLE 150 MG/1
TABLET ORAL
Qty: 2 TABLET | Refills: 4 | Status: SHIPPED | OUTPATIENT
Start: 2022-09-28 | End: 2023-06-16 | Stop reason: SDUPTHER

## 2022-09-28 RX ORDER — AMLODIPINE AND BENAZEPRIL HYDROCHLORIDE 5; 10 MG/1; MG/1
1 CAPSULE ORAL DAILY
Qty: 90 CAPSULE | Refills: 3 | Status: SHIPPED | OUTPATIENT
Start: 2022-09-28 | End: 2023-08-07 | Stop reason: SDUPTHER

## 2022-09-28 RX ORDER — HYDROCHLOROTHIAZIDE 12.5 MG/1
12.5 TABLET ORAL DAILY
Qty: 90 TABLET | Refills: 3 | Status: SHIPPED | OUTPATIENT
Start: 2022-09-28 | End: 2023-08-07 | Stop reason: SDUPTHER
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
## 2022-09-29 ENCOUNTER — PATIENT MESSAGE (OUTPATIENT)
Dept: FAMILY MEDICINE | Facility: CLINIC | Age: 57
End: 2022-09-29
Payer: COMMERCIAL

## 2022-09-29 DIAGNOSIS — R73.09 ABNORMAL GLUCOSE LEVEL: Primary | ICD-10-CM

## 2022-10-10 ENCOUNTER — PATIENT MESSAGE (OUTPATIENT)
Dept: ADMINISTRATIVE | Facility: HOSPITAL | Age: 57
End: 2022-10-10
Payer: COMMERCIAL

## 2022-11-03 ENCOUNTER — PATIENT MESSAGE (OUTPATIENT)
Dept: ADMINISTRATIVE | Facility: HOSPITAL | Age: 57
End: 2022-11-03
Payer: COMMERCIAL

## 2023-01-05 NOTE — TELEPHONE ENCOUNTER
"Good morning!  Your lab results are in!  Your lipids (cholesterol) shows your total cholesterol is in good control.  Continue a  heart healthy diet that includes low fat foods, limited or no fried foods,and limitation on "junk foods", like chips.  Instead, recommendation is to bake, broil or boil your foods and include fish and chicken in your diet.    Your comprehensive metabolic profile (kidney & liver function, protein, electrolytes, calcium) shows all is in the normal range except for an elevated blood sugar.  Liver function tests remain slightly elevated. .  Your complete blood cell count shows your blood cells are in no good combination and number.  No anemia present. .  Your TSH shows your thyroid is in the normal range  HIV/Hepatitis C screens both negative.    Significant finding:  your vitamin D level is low.  Please start taking Vitamin D 50 mg (or 2000 international units, or IUs) daily.      Please call or message if you have any questions.    Thank you for using [facility]! Bev McCoy, APRN, CNP, FNP-BC  [facility]  " Bexarotene Pregnancy And Lactation Text: This medication is Pregnancy Category X and should not be given to women who are pregnant or may become pregnant. This medication should not be used if you are breast feeding.

## 2023-01-18 ENCOUNTER — PATIENT MESSAGE (OUTPATIENT)
Dept: ADMINISTRATIVE | Facility: HOSPITAL | Age: 58
End: 2023-01-18
Payer: COMMERCIAL

## 2023-02-28 ENCOUNTER — TELEPHONE (OUTPATIENT)
Dept: DERMATOLOGY | Facility: CLINIC | Age: 58
End: 2023-02-28
Payer: COMMERCIAL

## 2023-02-28 ENCOUNTER — TELEPHONE (OUTPATIENT)
Dept: PAIN MEDICINE | Facility: CLINIC | Age: 58
End: 2023-02-28
Payer: COMMERCIAL

## 2023-02-28 DIAGNOSIS — J30.89 NON-SEASONAL ALLERGIC RHINITIS DUE TO OTHER ALLERGIC TRIGGER: ICD-10-CM

## 2023-02-28 RX ORDER — ALBUTEROL SULFATE 90 UG/1
4 AEROSOL, METERED RESPIRATORY (INHALATION) EVERY 6 HOURS PRN
Qty: 18 G | Refills: 1 | OUTPATIENT
Start: 2023-02-28 | End: 2023-02-28 | Stop reason: SDUPTHER

## 2023-02-28 NOTE — TELEPHONE ENCOUNTER
----- Message from Taya Johnson sent at 2/28/2023 12:56 PM CST -----  Regarding: pt call back  Please refill the medication(s) listed below. Please call the patient when the prescription(s) is ready for  at this phone number              Medication #1albuterol (PROVENTIL/VENTOLIN HFA) 90 mcg/actuation inhaler         Medication #2           Preferred Pharmacy:   [facility] #[id number] - [place] - [address] AT [place] [address] [address] [place] [address]  Phone: [phone] Fax: [phone]

## 2023-02-28 NOTE — TELEPHONE ENCOUNTER
----- Message from Shyann Peraza sent at 2/28/2023  1:16 PM CST -----  Contact: pt  Type: Needs Medical Advice         Who Called: pt  Best Call Back Number:[phone]  Additional Information: Requesting a call back regarding  this is not a current pt. She has a few questions for the office. She is wanting to make an appt but wanting to make sure she can be seen for the reasoning's she has. Pt asking for a call back please.   Please Advise- Thank you

## 2023-02-28 NOTE — TELEPHONE ENCOUNTER
Spoke with pt who started having back pain after a massage. She wasn't sure who to schedule with. She describes having muscular pain, so she opted to schedule with primary care sooner than what our first available. If needed, she will return the call to our office to schedule an appointment.

## 2023-02-28 NOTE — TELEPHONE ENCOUNTER
No new care gaps identified.  [facility] Embedded Care Gaps. Reference number: [id number]. 2/28/2023   1:41:07 PM CST

## 2023-02-28 NOTE — TELEPHONE ENCOUNTER
----- Message from Taya Johnson sent at 2/28/2023 12:58 PM CST -----  Regarding: pt call back  Name of Who is Calling:VAISHALI MOSES [[id number]]           What is the request in detail: Pt has a mole on her face and wants removed pt needs to bake soon as possible            Can the clinic reply by [facility]:           What Number to Call Back [facility] not [facility]:[phone]

## 2023-03-03 ENCOUNTER — OFFICE VISIT (OUTPATIENT)
Dept: FAMILY MEDICINE | Facility: CLINIC | Age: 58
End: 2023-03-03
Payer: COMMERCIAL

## 2023-03-03 VITALS
HEART RATE: 81 BPM | OXYGEN SATURATION: 97 % | BODY MASS INDEX: 39.78 KG/M2 | HEIGHT: 64 IN | WEIGHT: 233 LBS | DIASTOLIC BLOOD PRESSURE: 66 MMHG | SYSTOLIC BLOOD PRESSURE: 114 MMHG

## 2023-03-03 DIAGNOSIS — M62.838 MUSCLE SPASM: Primary | ICD-10-CM

## 2023-03-03 DIAGNOSIS — J01.90 ACUTE BACTERIAL SINUSITIS: ICD-10-CM

## 2023-03-03 DIAGNOSIS — B96.89 ACUTE BACTERIAL SINUSITIS: ICD-10-CM

## 2023-03-03 DIAGNOSIS — M79.2 NEURALGIA: ICD-10-CM

## 2023-03-03 DIAGNOSIS — J30.89 NON-SEASONAL ALLERGIC RHINITIS DUE TO OTHER ALLERGIC TRIGGER: ICD-10-CM

## 2023-03-03 PROCEDURE — 4010F ACE/ARB THERAPY RXD/TAKEN: CPT | Mod: CPTII,S$GLB,, | Performed by: NURSE PRACTITIONER

## 2023-03-03 PROCEDURE — 3078F PR MOST RECENT DIASTOLIC BLOOD PRESSURE < 80 MM HG: ICD-10-PCS | Mod: CPTII,S$GLB,, | Performed by: NURSE PRACTITIONER

## 2023-03-03 PROCEDURE — 1159F PR MEDICATION LIST DOCUMENTED IN MEDICAL RECORD: ICD-10-PCS | Mod: CPTII,S$GLB,, | Performed by: NURSE PRACTITIONER

## 2023-03-03 PROCEDURE — 99214 OFFICE O/P EST MOD 30 MIN: CPT | Mod: 25,S$GLB,, | Performed by: NURSE PRACTITIONER

## 2023-03-03 PROCEDURE — 99999 PR PBB SHADOW E&M-EST. PATIENT-LVL IV: CPT | Mod: PBBFAC,,, | Performed by: NURSE PRACTITIONER

## 2023-03-03 PROCEDURE — 4010F PR ACE/ARB THEARPY RXD/TAKEN: ICD-10-PCS | Mod: CPTII,S$GLB,, | Performed by: NURSE PRACTITIONER

## 2023-03-03 PROCEDURE — 3008F BODY MASS INDEX DOCD: CPT | Mod: CPTII,S$GLB,, | Performed by: NURSE PRACTITIONER

## 2023-03-03 PROCEDURE — 3074F PR MOST RECENT SYSTOLIC BLOOD PRESSURE < 130 MM HG: ICD-10-PCS | Mod: CPTII,S$GLB,, | Performed by: NURSE PRACTITIONER

## 2023-03-03 PROCEDURE — 96372 THER/PROPH/DIAG INJ SC/IM: CPT | Mod: S$GLB,,, | Performed by: NURSE PRACTITIONER

## 2023-03-03 PROCEDURE — 99999 PR PBB SHADOW E&M-EST. PATIENT-LVL IV: ICD-10-PCS | Mod: PBBFAC,,, | Performed by: NURSE PRACTITIONER

## 2023-03-03 PROCEDURE — 99214 PR OFFICE/OUTPT VISIT, EST, LEVL IV, 30-39 MIN: ICD-10-PCS | Mod: 25,S$GLB,, | Performed by: NURSE PRACTITIONER

## 2023-03-03 PROCEDURE — 3074F SYST BP LT 130 MM HG: CPT | Mod: CPTII,S$GLB,, | Performed by: NURSE PRACTITIONER

## 2023-03-03 PROCEDURE — 1159F MED LIST DOCD IN RCRD: CPT | Mod: CPTII,S$GLB,, | Performed by: NURSE PRACTITIONER

## 2023-03-03 PROCEDURE — 3078F DIAST BP <80 MM HG: CPT | Mod: CPTII,S$GLB,, | Performed by: NURSE PRACTITIONER

## 2023-03-03 PROCEDURE — 96372 PR INJECTION,THERAP/PROPH/DIAG2ST, IM OR SUBCUT: ICD-10-PCS | Mod: S$GLB,,, | Performed by: NURSE PRACTITIONER

## 2023-03-03 PROCEDURE — 3008F PR BODY MASS INDEX (BMI) DOCUMENTED: ICD-10-PCS | Mod: CPTII,S$GLB,, | Performed by: NURSE PRACTITIONER

## 2023-03-03 RX ORDER — CYCLOBENZAPRINE HCL 10 MG
10 TABLET ORAL 3 TIMES DAILY PRN
Qty: 30 TABLET | Refills: 0 | Status: SHIPPED | OUTPATIENT
Start: 2023-03-03 | End: 2023-03-13

## 2023-03-03 RX ORDER — KETOROLAC TROMETHAMINE 30 MG/ML
60 INJECTION, SOLUTION INTRAMUSCULAR; INTRAVENOUS
Status: COMPLETED | OUTPATIENT
Start: 2023-03-03 | End: 2023-03-03

## 2023-03-03 RX ORDER — DOXYCYCLINE 100 MG/1
100 CAPSULE ORAL 2 TIMES DAILY
Qty: 14 CAPSULE | Refills: 0 | Status: SHIPPED | OUTPATIENT
Start: 2023-03-03 | End: 2023-03-10

## 2023-03-03 RX ORDER — NAPROXEN 500 MG/1
500 TABLET ORAL 2 TIMES DAILY
Qty: 20 TABLET | Refills: 0 | Status: SHIPPED | OUTPATIENT
Start: 2023-03-03 | End: 2023-03-13

## 2023-03-03 RX ORDER — ALBUTEROL SULFATE 90 UG/1
4 AEROSOL, METERED RESPIRATORY (INHALATION) EVERY 6 HOURS PRN
Qty: 8.5 G | Refills: 1 | Status: SHIPPED | OUTPATIENT
Start: 2023-03-03

## 2023-03-03 RX ADMIN — KETOROLAC TROMETHAMINE 60 MG: 30 INJECTION, SOLUTION INTRAMUSCULAR; INTRAVENOUS at 05:03
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
## 2023-03-03 NOTE — PATIENT INSTRUCTIONS
Increase water  Saline nasal spray  Antibiotic      Heating pad  Pain patch or rub from the store.  Naproxen twice a day with food  Muscle relaxant up to 3x daily while resting.    If you have concerns or questions, please do not hesitate to call.  If you have any questions, please do not hesitate to call.  You can reach us at [phone] Monday through Friday.  Or call Dr. Servin/AZEEM Funk    Thank you for using the [facility]!    YOVANI Siddiqui, CNP, FNP-BC  [facility]    To rate your experience with LEONILA Siddiqui, click on the link below:      [email or web address]

## 2023-04-11 ENCOUNTER — PATIENT MESSAGE (OUTPATIENT)
Dept: ADMINISTRATIVE | Facility: HOSPITAL | Age: 58
End: 2023-04-11
Payer: COMMERCIAL

## 2023-04-25 ENCOUNTER — PATIENT MESSAGE (OUTPATIENT)
Dept: ADMINISTRATIVE | Facility: HOSPITAL | Age: 58
End: 2023-04-25
Payer: COMMERCIAL

## 2023-04-26 ENCOUNTER — OFFICE VISIT (OUTPATIENT)
Dept: FAMILY MEDICINE | Facility: CLINIC | Age: 58
End: 2023-04-26
Payer: COMMERCIAL

## 2023-04-26 VITALS
BODY MASS INDEX: 39.11 KG/M2 | HEART RATE: 80 BPM | DIASTOLIC BLOOD PRESSURE: 70 MMHG | HEIGHT: 64 IN | WEIGHT: 229.06 LBS | TEMPERATURE: 99 F | SYSTOLIC BLOOD PRESSURE: 104 MMHG | OXYGEN SATURATION: 97 %

## 2023-04-26 DIAGNOSIS — J22 LOWER RESPIRATORY INFECTION: Primary | ICD-10-CM

## 2023-04-26 DIAGNOSIS — Z12.12 ENCOUNTER FOR COLORECTAL CANCER SCREENING: Primary | ICD-10-CM

## 2023-04-26 DIAGNOSIS — Z12.11 ENCOUNTER FOR COLORECTAL CANCER SCREENING: Primary | ICD-10-CM

## 2023-04-26 DIAGNOSIS — J01.90 ACUTE BACTERIAL SINUSITIS: ICD-10-CM

## 2023-04-26 DIAGNOSIS — B96.89 ACUTE BACTERIAL SINUSITIS: ICD-10-CM

## 2023-04-26 PROCEDURE — 1159F MED LIST DOCD IN RCRD: CPT | Mod: CPTII,S$GLB,, | Performed by: NURSE PRACTITIONER

## 2023-04-26 PROCEDURE — 3074F SYST BP LT 130 MM HG: CPT | Mod: CPTII,S$GLB,, | Performed by: NURSE PRACTITIONER

## 2023-04-26 PROCEDURE — 1159F PR MEDICATION LIST DOCUMENTED IN MEDICAL RECORD: ICD-10-PCS | Mod: CPTII,S$GLB,, | Performed by: NURSE PRACTITIONER

## 2023-04-26 PROCEDURE — 1160F PR REVIEW ALL MEDS BY PRESCRIBER/CLIN PHARMACIST DOCUMENTED: ICD-10-PCS | Mod: CPTII,S$GLB,, | Performed by: NURSE PRACTITIONER

## 2023-04-26 PROCEDURE — 4010F ACE/ARB THERAPY RXD/TAKEN: CPT | Mod: CPTII,S$GLB,, | Performed by: NURSE PRACTITIONER

## 2023-04-26 PROCEDURE — 3074F PR MOST RECENT SYSTOLIC BLOOD PRESSURE < 130 MM HG: ICD-10-PCS | Mod: CPTII,S$GLB,, | Performed by: NURSE PRACTITIONER

## 2023-04-26 PROCEDURE — 4010F PR ACE/ARB THEARPY RXD/TAKEN: ICD-10-PCS | Mod: CPTII,S$GLB,, | Performed by: NURSE PRACTITIONER

## 2023-04-26 PROCEDURE — 96372 PR INJECTION,THERAP/PROPH/DIAG2ST, IM OR SUBCUT: ICD-10-PCS | Mod: S$GLB,,, | Performed by: NURSE PRACTITIONER

## 2023-04-26 PROCEDURE — 99213 PR OFFICE/OUTPT VISIT, EST, LEVL III, 20-29 MIN: ICD-10-PCS | Mod: 25,S$GLB,, | Performed by: NURSE PRACTITIONER

## 2023-04-26 PROCEDURE — 1160F RVW MEDS BY RX/DR IN RCRD: CPT | Mod: CPTII,S$GLB,, | Performed by: NURSE PRACTITIONER

## 2023-04-26 PROCEDURE — 3008F PR BODY MASS INDEX (BMI) DOCUMENTED: ICD-10-PCS | Mod: CPTII,S$GLB,, | Performed by: NURSE PRACTITIONER

## 2023-04-26 PROCEDURE — 3008F BODY MASS INDEX DOCD: CPT | Mod: CPTII,S$GLB,, | Performed by: NURSE PRACTITIONER

## 2023-04-26 PROCEDURE — 99999 PR PBB SHADOW E&M-EST. PATIENT-LVL IV: ICD-10-PCS | Mod: PBBFAC,,, | Performed by: NURSE PRACTITIONER

## 2023-04-26 PROCEDURE — 3078F DIAST BP <80 MM HG: CPT | Mod: CPTII,S$GLB,, | Performed by: NURSE PRACTITIONER

## 2023-04-26 PROCEDURE — 3078F PR MOST RECENT DIASTOLIC BLOOD PRESSURE < 80 MM HG: ICD-10-PCS | Mod: CPTII,S$GLB,, | Performed by: NURSE PRACTITIONER

## 2023-04-26 PROCEDURE — 96372 THER/PROPH/DIAG INJ SC/IM: CPT | Mod: S$GLB,,, | Performed by: NURSE PRACTITIONER

## 2023-04-26 PROCEDURE — 99999 PR PBB SHADOW E&M-EST. PATIENT-LVL IV: CPT | Mod: PBBFAC,,, | Performed by: NURSE PRACTITIONER

## 2023-04-26 PROCEDURE — 99213 OFFICE O/P EST LOW 20 MIN: CPT | Mod: 25,S$GLB,, | Performed by: NURSE PRACTITIONER

## 2023-04-26 RX ORDER — AZELASTINE 1 MG/ML
1 SPRAY, METERED NASAL 2 TIMES DAILY
Qty: 30 ML | Refills: 0 | Status: SHIPPED | OUTPATIENT
Start: 2023-04-26 | End: 2024-04-25

## 2023-04-26 RX ORDER — DEXAMETHASONE SODIUM PHOSPHATE 4 MG/ML
4 INJECTION, SOLUTION INTRA-ARTICULAR; INTRALESIONAL; INTRAMUSCULAR; INTRAVENOUS; SOFT TISSUE
Status: COMPLETED | OUTPATIENT
Start: 2023-04-26 | End: 2023-04-26

## 2023-04-26 RX ORDER — BENZONATATE 200 MG/1
200 CAPSULE ORAL 3 TIMES DAILY PRN
Qty: 30 CAPSULE | Refills: 0 | Status: SHIPPED | OUTPATIENT
Start: 2023-04-26 | End: 2023-05-06

## 2023-04-26 RX ORDER — FAMOTIDINE 20 MG/1
20 TABLET, FILM COATED ORAL 2 TIMES DAILY
Qty: 14 TABLET | Refills: 0 | Status: SHIPPED | OUTPATIENT
Start: 2023-04-26 | End: 2024-01-29

## 2023-04-26 RX ORDER — IBUPROFEN 400 MG/1
400 TABLET ORAL EVERY 4 HOURS
COMMUNITY

## 2023-04-26 RX ORDER — AZITHROMYCIN 250 MG/1
TABLET, FILM COATED ORAL
Qty: 6 TABLET | Refills: 0 | Status: SHIPPED | OUTPATIENT
Start: 2023-04-26 | End: 2023-05-01

## 2023-04-26 RX ADMIN — DEXAMETHASONE SODIUM PHOSPHATE 4 MG: 4 INJECTION, SOLUTION INTRA-ARTICULAR; INTRALESIONAL; INTRAMUSCULAR; INTRAVENOUS; SOFT TISSUE at 04:04

## 2023-04-26 NOTE — PROGRESS NOTES
Subjective     Patient ID: Lori Herrera is a 57 y.o. female.    Chief Complaint: Cough, Shortness of Breath, Nasal Congestion, and Sore Throat    Doxycycline 3/3/23, cough never resolved, worsening again over the last week. Has noticed sores on her gums bilaterally for a few days.     Cough  Associated symptoms include headaches, a sore throat and shortness of breath. Pertinent negatives include no chills, ear pain or fever.   Shortness of Breath  Associated symptoms include headaches and a sore throat. Pertinent negatives include no ear pain or fever.   Sore Throat   Associated symptoms include coughing, headaches and shortness of breath. Pertinent negatives include no ear pain.   Review of Systems   Constitutional:  Positive for fatigue. Negative for chills and fever.   HENT:  Positive for sore throat and voice change. Negative for ear pain.    Respiratory:  Positive for cough and shortness of breath.    Cardiovascular: Negative.    Gastrointestinal: Negative.    Genitourinary: Negative.    Neurological:  Positive for headaches.        Objective     Physical Exam  Constitutional:       General: She is not in acute distress.     Appearance: Normal appearance.   HENT:      Head: Normocephalic and atraumatic.      Right Ear: Tympanic membrane normal.      Left Ear: Tympanic membrane normal.      Nose: Congestion present.      Mouth/Throat:      Pharynx: Posterior oropharyngeal erythema present.   Eyes:      Pupils: Pupils are equal, round, and reactive to light.   Cardiovascular:      Rate and Rhythm: Normal rate and regular rhythm.   Pulmonary:      Effort: Pulmonary effort is normal.      Breath sounds: Normal breath sounds. No wheezing.   Neurological:      Mental Status: She is alert and oriented to person, place, and time.   Psychiatric:         Mood and Affect: Mood normal.         Behavior: Behavior normal.          Assessment and Plan     Problem List Items Addressed This Visit    None  Visit Diagnoses        Lower respiratory infection    -  Primary    Relevant Medications    azelastine (ASTELIN) 137 mcg (0.1 %) nasal spray    azithromycin (Z-ANTONIO) 250 MG tablet    benzonatate (TESSALON) 200 MG capsule    famotidine (PEPCID) 20 MG tablet    Acute bacterial sinusitis        Relevant Medications    dexAMETHasone injection 4 mg (Completed)    azelastine (ASTELIN) 137 mcg (0.1 %) nasal spray    famotidine (PEPCID) 20 MG tablet          Follow up with PCP if symptoms are not resolving in 48-72 hours, follow up immediately for new or worsening symptoms, ED precautions discussed.    1. Lower respiratory infection    - azelastine (ASTELIN) 137 mcg (0.1 %) nasal spray; Spray 1 spray (137 mcg total) by Nasal route 2 (two) times daily.  Dispense: 30 mL; Refill: 0  - azithromycin (Z-ANTONIO) 250 MG tablet; Take 2 tablets by mouth on day 1; Take 1 tablet by mouth on days 2-5  Dispense: 6 tablet; Refill: 0  - benzonatate (TESSALON) 200 MG capsule; Take 1 capsule (200 mg total) by mouth 3 (three) times daily as needed.  Dispense: 30 capsule; Refill: 0  - famotidine (PEPCID) 20 MG tablet; Take 1 tablet (20 mg total) by mouth 2 (two) times daily. for 7 days  Dispense: 14 tablet; Refill: 0    2. Acute bacterial sinusitis    - dexAMETHasone injection 4 mg  - azelastine (ASTELIN) 137 mcg (0.1 %) nasal spray; Spray 1 spray (137 mcg total) by Nasal route 2 (two) times daily.  Dispense: 30 mL; Refill: 0  - famotidine (PEPCID) 20 MG tablet; Take 1 tablet (20 mg total) by mouth 2 (two) times daily. for 7 days  Dispense: 14 tablet; Refill: 0

## 2023-05-17 ENCOUNTER — PATIENT MESSAGE (OUTPATIENT)
Dept: FAMILY MEDICINE | Facility: CLINIC | Age: 58
End: 2023-05-17
Payer: COMMERCIAL

## 2023-05-17 RX ORDER — MONTELUKAST SODIUM 10 MG/1
10 TABLET ORAL NIGHTLY
Qty: 30 TABLET | Refills: 0 | Status: SHIPPED | OUTPATIENT
Start: 2023-05-17 | End: 2023-06-09

## 2023-05-17 RX ORDER — METHYLPREDNISOLONE 4 MG/1
TABLET ORAL
Qty: 1 EACH | Refills: 0 | Status: SHIPPED | OUTPATIENT
Start: 2023-05-17 | End: 2023-06-07

## 2023-05-17 NOTE — TELEPHONE ENCOUNTER
Please see Apptentive message. Would you like her to do a evisit or a virtual with you for continued symptoms? Please advise.

## 2023-05-17 NOTE — TELEPHONE ENCOUNTER
We would not refill the same antibiotic. If she is continuing to have symptoms she needs to be seen and evaluated by whoever has an open appointment and may need a chest xray depending on her exam.

## 2023-05-17 NOTE — TELEPHONE ENCOUNTER
Medrol dose pack sent. I also sent singulair, I would recommend she take this for the next 30 days to see if we can get rid of the cough.

## 2023-06-02 ENCOUNTER — TELEPHONE (OUTPATIENT)
Dept: GASTROENTEROLOGY | Facility: CLINIC | Age: 58
End: 2023-06-02
Payer: COMMERCIAL

## 2023-06-02 ENCOUNTER — TELEPHONE (OUTPATIENT)
Dept: DERMATOLOGY | Facility: CLINIC | Age: 58
End: 2023-06-02
Payer: COMMERCIAL

## 2023-06-02 NOTE — TELEPHONE ENCOUNTER
C-scope is scheduled on 8/25 with Dr. Thomas at main [facility] in [place]. Prep instructions will be sent to pt via [employer] and via mail. Pt verbalizes understanding.

## 2023-06-02 NOTE — TELEPHONE ENCOUNTER
----- Message from Rivas Waters MA sent at 6/2/2023 12:26 PM CDT -----    ----- Message -----  From: Delroy Aponte LPN  Sent: 6/2/2023  11:40 AM CDT  To: [facility] Clinical Staff    Good morning,    Pt would like to reschedule 6/5 appt.    Thanks,

## 2023-06-09 RX ORDER — MONTELUKAST SODIUM 10 MG/1
TABLET ORAL
Qty: 30 TABLET | Refills: 0 | Status: SHIPPED | OUTPATIENT
Start: 2023-06-09 | End: 2023-07-10

## 2023-06-16 DIAGNOSIS — B37.9 YEAST INFECTION: ICD-10-CM

## 2023-06-16 RX ORDER — FLUCONAZOLE 150 MG/1
TABLET ORAL
Qty: 2 TABLET | Refills: 4 | Status: SHIPPED | OUTPATIENT
Start: 2023-06-16 | End: 2023-08-07 | Stop reason: SDUPTHER

## 2023-07-10 RX ORDER — MONTELUKAST SODIUM 10 MG/1
TABLET ORAL
Qty: 30 TABLET | Refills: 0 | Status: SHIPPED | OUTPATIENT
Start: 2023-07-10 | End: 2023-08-07

## 2023-08-07 DIAGNOSIS — R60.0 LOCALIZED EDEMA: ICD-10-CM

## 2023-08-07 DIAGNOSIS — I10 ESSENTIAL HYPERTENSION: ICD-10-CM

## 2023-08-07 DIAGNOSIS — B37.9 YEAST INFECTION: ICD-10-CM

## 2023-08-08 RX ORDER — FLUCONAZOLE 150 MG/1
TABLET ORAL
Qty: 2 TABLET | Refills: 4 | Status: SHIPPED | OUTPATIENT
Start: 2023-08-08

## 2023-08-08 RX ORDER — AMLODIPINE AND BENAZEPRIL HYDROCHLORIDE 5; 10 MG/1; MG/1
1 CAPSULE ORAL DAILY
Qty: 90 CAPSULE | Refills: 3 | Status: SHIPPED | OUTPATIENT
Start: 2023-08-08 | End: 2023-10-18 | Stop reason: RX

## 2023-08-08 RX ORDER — HYDROCHLOROTHIAZIDE 12.5 MG/1
12.5 TABLET ORAL DAILY
Qty: 90 TABLET | Refills: 3 | Status: SHIPPED | OUTPATIENT
Start: 2023-08-08

## 2023-08-18 ENCOUNTER — TELEPHONE (OUTPATIENT)
Dept: SURGERY | Facility: CLINIC | Age: 58
End: 2023-08-18
Payer: COMMERCIAL

## 2023-08-18 NOTE — TELEPHONE ENCOUNTER
----- Message from Bev Ordaz sent at 8/18/2023 12:43 PM CDT -----  Pt sent a message about wanting to cancel her colonoscopy. Please cancel          TY

## 2023-08-29 NOTE — TELEPHONE ENCOUNTER
No care due was identified.  [facility] Embedded Care Due Messages. Reference number: [id number].   8/07/2023 5:11:30 PM CDT  
Please see the attached refill request.  
Darrius Goncalves(Attending)

## 2023-09-06 RX ORDER — MONTELUKAST SODIUM 10 MG/1
TABLET ORAL
Qty: 30 TABLET | Refills: 0 | Status: SHIPPED | OUTPATIENT
Start: 2023-09-06 | End: 2023-09-23

## 2023-09-20 NOTE — TELEPHONE ENCOUNTER
No care due was identified.  [facility] Embedded Care Due Messages. Reference number: [id number].   9/20/2023 5:02:49 PM CDT

## 2023-09-20 NOTE — TELEPHONE ENCOUNTER
Refill Routing Note   Medication(s) are not appropriate for processing by [facility] for the following reason(s):      New or recently adjusted medication  No active prescription written by provider    [facility] action(s):  Defer Care Due:  None identified            Appointments  past 12m or future 3m with PCP    Date Provider   Last Visit   9/28/2022 Carie Servin MD   Next Visit   Visit date not found Carie Servin MD   ED visits in past 90 days: 0        Note composed:5:23 PM 09/20/2023

## 2023-09-23 RX ORDER — MONTELUKAST SODIUM 10 MG/1
TABLET ORAL
Qty: 90 TABLET | Refills: 1 | Status: SHIPPED | OUTPATIENT
Start: 2023-09-23

## 2023-10-11 DIAGNOSIS — I10 HTN (HYPERTENSION): ICD-10-CM

## 2023-10-18 ENCOUNTER — TELEPHONE (OUTPATIENT)
Dept: FAMILY MEDICINE | Facility: CLINIC | Age: 58
End: 2023-10-18
Payer: COMMERCIAL

## 2023-10-18 DIAGNOSIS — I10 ESSENTIAL HYPERTENSION: Primary | ICD-10-CM

## 2023-10-18 RX ORDER — AMLODIPINE BESYLATE 5 MG/1
5 TABLET ORAL DAILY
Qty: 90 TABLET | Refills: 0 | Status: SHIPPED | OUTPATIENT
Start: 2023-10-18 | End: 2024-03-13 | Stop reason: SDUPTHER

## 2023-10-18 RX ORDER — BENAZEPRIL HYDROCHLORIDE 10 MG/1
10 TABLET ORAL DAILY
Qty: 90 TABLET | Refills: 0 | Status: SHIPPED | OUTPATIENT
Start: 2023-10-18 | End: 2024-03-13 | Stop reason: SDUPTHER

## 2023-10-18 NOTE — TELEPHONE ENCOUNTER
----- Message from Ho Saab sent at 10/18/2023 11:05 AM CDT -----  Regarding: rt call  Type:  Patient Returning Call    Who Called:pt    Who Left Message for Patient:unknown    Does the patient know what this is regarding?:yes    Best Call Back Number:[phone]      Additional Information: pt wants a call back to discuss meds update.

## 2023-10-18 NOTE — TELEPHONE ENCOUNTER
Spoke to pt. She does not have insurance anymore. The amlodipine-benazepril (combination) will be very expensive. [facility] pharmacy can reduce the cost if you split the medication into two different prescriptions. Can this be  and sent to [facility]?

## 2023-12-04 ENCOUNTER — TELEPHONE (OUTPATIENT)
Dept: FAMILY MEDICINE | Facility: CLINIC | Age: 58
End: 2023-12-04
Payer: COMMERCIAL

## 2023-12-04 NOTE — TELEPHONE ENCOUNTER
----- Message from Noemi Adair sent at 12/4/2023  9:24 AM CST -----  Regarding: Needs Medical Advice  Contact: patient at [phone]  Type: Needs Medical Advice  Who Called:  patient at [phone]    Additional Information: calling to discuss a change of insurance and wanting to make sure that Dr. Servin takes it. Please call and advise. Thank you

## 2024-01-29 ENCOUNTER — OFFICE VISIT (OUTPATIENT)
Dept: FAMILY MEDICINE | Facility: CLINIC | Age: 59
End: 2024-01-29
Payer: MEDICAID

## 2024-01-29 VITALS
OXYGEN SATURATION: 97 % | DIASTOLIC BLOOD PRESSURE: 60 MMHG | SYSTOLIC BLOOD PRESSURE: 120 MMHG | WEIGHT: 235.13 LBS | BODY MASS INDEX: 40.36 KG/M2 | HEART RATE: 101 BPM
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
## 2024-01-29 DIAGNOSIS — I10 HYPERTENSION, UNSPECIFIED TYPE: ICD-10-CM

## 2024-01-29 DIAGNOSIS — J06.9 UPPER RESPIRATORY TRACT INFECTION, UNSPECIFIED TYPE: ICD-10-CM

## 2024-01-29 DIAGNOSIS — M25.511 RIGHT SHOULDER PAIN, UNSPECIFIED CHRONICITY: ICD-10-CM

## 2024-01-29 PROCEDURE — 3074F SYST BP LT 130 MM HG: CPT | Mod: CPTII,,,

## 2024-01-29 PROCEDURE — 96372 THER/PROPH/DIAG INJ SC/IM: CPT | Mod: PBBFAC,PO

## 2024-01-29 PROCEDURE — 3078F DIAST BP <80 MM HG: CPT | Mod: CPTII,,,

## 2024-01-29 PROCEDURE — 99214 OFFICE O/P EST MOD 30 MIN: CPT | Mod: 25,PBBFAC,PO

## 2024-01-29 PROCEDURE — 1159F MED LIST DOCD IN RCRD: CPT | Mod: CPTII,,,

## 2024-01-29 PROCEDURE — 99999PBSHW PR PBB SHADOW TECHNICAL ONLY FILED TO HB: Mod: PBBFAC,,,

## 2024-01-29 PROCEDURE — 99214 OFFICE O/P EST MOD 30 MIN: CPT | Mod: S$PBB,,,

## 2024-01-29 PROCEDURE — 3008F BODY MASS INDEX DOCD: CPT | Mod: CPTII,,,

## 2024-01-29 PROCEDURE — 99999 PR PBB SHADOW E&M-EST. PATIENT-LVL IV: CPT | Mod: PBBFAC,,,

## 2024-01-29 RX ORDER — KETOROLAC TROMETHAMINE 30 MG/ML
30 INJECTION, SOLUTION INTRAMUSCULAR; INTRAVENOUS
Status: COMPLETED | OUTPATIENT
Start: 2024-01-29 | End: 2024-01-29

## 2024-01-29 RX ORDER — DOXYCYCLINE 100 MG/1
100 CAPSULE ORAL 2 TIMES DAILY
Qty: 14 CAPSULE | Refills: 0 | Status: SHIPPED | OUTPATIENT
Start: 2024-01-29 | End: 2024-02-05

## 2024-01-29 RX ORDER — TIZANIDINE 4 MG/1
4 TABLET ORAL EVERY 8 HOURS
Qty: 30 TABLET | Refills: 0 | Status: SHIPPED | OUTPATIENT
Start: 2024-01-29 | End: 2024-01-29

## 2024-01-29 RX ORDER — TIZANIDINE 4 MG/1
4 TABLET ORAL EVERY 8 HOURS
Qty: 30 TABLET | Refills: 0 | Status: SHIPPED | OUTPATIENT
Start: 2024-01-29 | End: 2024-02-08

## 2024-01-29 RX ORDER — KETOROLAC TROMETHAMINE 15 MG/ML
30 INJECTION, SOLUTION INTRAMUSCULAR; INTRAVENOUS
Status: CANCELLED | OUTPATIENT
Start: 2024-01-29 | End: 2024-01-29

## 2024-01-29 RX ADMIN — KETOROLAC TROMETHAMINE 30 MG: 30 INJECTION, SOLUTION INTRAMUSCULAR; INTRAVENOUS at 12:01

## 2024-01-29 NOTE — PROGRESS NOTES
"  Name: Lori Herrera  MRN: [id number]  : 1965  PCP: Carie Servin MD    HPI    Patient follows with Dr. Servin, new to me. Complains of right shoulder and neck pain for a few months. She also complains of lower back pain. She reports "overdoing it" during [place]. She reports using advil as needed for pain with no improvement in symptoms. Reports dog bite  to left posterior leg. She completed course of antibiotic for 7 days. She is mainly concerned about her shoulder and neck pain. No interest in PT at this time. She also complains of ongoing upper respiratory symptoms for 2 weeks. Reports little improvement with OTC medications.     Review of Systems   HENT:  Positive for congestion and rhinorrhea.    Gastrointestinal:  Negative for nausea and vomiting.   Musculoskeletal:  Positive for back pain and myalgias.   Skin:  Positive for wound. Negative for color change.       Patient Active Problem List   Diagnosis    Hypertension    HTN (hypertension)    Obesity    Elevated LFTs    Fatty liver    Other constipation    Rib pain on left side       Vitals:    24 1142   BP: 120/60   Pulse: 101       Physical Exam  Constitutional:       General: She is not in acute distress.     Appearance: She is well-developed.   HENT:      Head: Normocephalic and atraumatic.      Right Ear: External ear normal.      Left Ear: External ear normal.   Eyes:      Conjunctiva/sclera: Conjunctivae normal.      Pupils: Pupils are equal, round, and reactive to light.   Neck:      Thyroid: No thyromegaly.   Cardiovascular:      Rate and Rhythm: Normal rate and regular rhythm.      Pulses: Normal pulses.      Heart sounds: Normal heart sounds, S1 normal and S2 normal.   Pulmonary:      Effort: Pulmonary effort is normal. No respiratory distress.      Breath sounds: Normal breath sounds.   Chest:      Chest wall: No tenderness.   Musculoskeletal:         General: Tenderness present. No swelling. Normal range of motion.      Cervical " back: Normal range of motion and neck supple.   Skin:     General: Skin is warm and dry.      Coloration: Skin is not jaundiced or pale.      Findings: No erythema.      Comments: Crusted wound noted to left posterior leg approximately 1 cm in length   Neurological:      General: No focal deficit present.      Mental Status: She is alert and oriented to person, place, and time.      Cranial Nerves: No cranial nerve deficit.   Psychiatric:         Mood and Affect: Mood normal.         Behavior: Behavior normal.         1. Dog bite, subsequent encounter  -     Ambulatory referral/consult to Wound Clinic; Future; Expected date: 02/05/2024    2. Right shoulder pain, unspecified chronicity  -     Discontinue: tiZANidine (ZANAFLEX) 4 MG tablet; Take 1 tablet (4 mg total) by mouth every 8 (eight) hours. for 10 days  Dispense: 30 tablet; Refill: 0  -     ketorolac injection 30 mg  -     tiZANidine (ZANAFLEX) 4 MG tablet; Take 1 tablet (4 mg total) by mouth every 8 (eight) hours for 10 days  Dispense: 30 tablet; Refill: 0    3. Hypertension, unspecified type    4. Upper respiratory tract infection, unspecified type  -     doxycycline (MONODOX) 100 MG capsule; Take 1 capsule (100 mg total) by mouth 2 (two) times daily. for 7 days  Dispense: 14 capsule; Refill: 0        Follow up as needed or if symptoms fail to improve       AZEEM Haji  01/29/2024

## 2024-02-02 ENCOUNTER — TELEPHONE (OUTPATIENT)
Dept: FAMILY MEDICINE | Facility: CLINIC | Age: 59
End: 2024-02-02
Payer: MEDICAID

## 2024-02-02 NOTE — TELEPHONE ENCOUNTER
----- Message from Jovan Fleming sent at 2/2/2024  3:18 PM CST -----  Type:  Patient Requesting Referral    Who Called:  Patient  Does the patient already have the specialty appointment scheduled?:  No  If yes, what is the date of that appointment?:    Referral to What Specialty:  PT  Reason for Referral: Back and neck pain  Does the patient want the referral with a specific physician?:    [facility] the [facility]  Patient Requesting a Call Back?:  Yes  Best Call Back Number:   [phone]  Additional Information:

## 2024-02-05 DIAGNOSIS — M25.511 RIGHT SHOULDER PAIN, UNSPECIFIED CHRONICITY: Primary | ICD-10-CM

## 2024-03-13 DIAGNOSIS — I10 ESSENTIAL HYPERTENSION: ICD-10-CM

## 2024-03-13 RX ORDER — AMLODIPINE BESYLATE 5 MG/1
5 TABLET ORAL DAILY
Qty: 90 TABLET | Refills: 1 | Status: SHIPPED | OUTPATIENT
Start: 2024-03-13

## 2024-03-13 RX ORDER — BENAZEPRIL HYDROCHLORIDE 10 MG/1
10 TABLET ORAL DAILY
Qty: 90 TABLET | Refills: 1 | Status: SHIPPED | OUTPATIENT
Start: 2024-03-13

## 2024-03-13 NOTE — TELEPHONE ENCOUNTER
----- Message from Radha Hartmann sent at 3/13/2024  4:25 PM CDT -----  Contact: pt  Type:  Needs Medical Advice    Who Called: pt    Would the patient rather a call back or a response via [facility]? Call back    Best Call Back Number: [phone]    Additional Information: needs the orders for PT to be amended to include entire right side from neck to leg not just the shoulder.     Please call to advise  Thanks

## 2024-03-13 NOTE — TELEPHONE ENCOUNTER
----- Message from Juanito Torres sent at 3/13/2024  4:07 PM CDT -----  Contact: self  Type: Needs Medical Advice  Who Called:  Patient    Best Call Back Number: [phone]    Additional Information: Pt states she would like to speak with office has some questions about getting xray and also says she has leg pain can't feel middle toes.Please call back

## 2024-03-13 NOTE — TELEPHONE ENCOUNTER
Pt states she needs new order to say all her neck, shoulder, back, and leg on right side.    Pt states ever since her dog bite she has experience numbness in her middle 3 toes on her left foot.     Pt would like to know if she needs to get any blood work done she is concerned about staff infection.     Pt is also requesting a refill.

## 2024-03-22 ENCOUNTER — TELEPHONE (OUTPATIENT)
Dept: FAMILY MEDICINE | Facility: CLINIC | Age: 59
End: 2024-03-22
Payer: MEDICAID

## 2024-03-22 NOTE — TELEPHONE ENCOUNTER
----- Message from Antonio Watts Kian sent at 3/22/2024  4:01 PM CDT -----  Type: Needs Medical Advice  Who Called:  pt   Symptoms (please be specific):  shoulder pain, neck, back right side   Best Call Back Number: [phone]  Additional Information: pt stated she saw pato ray and he sent referral for pt to see PT and only included shoulder pt needs referral sent again for shoulder, neck, and back and asking to be advised with a status update asap due to pts request being ongoing for approx a week. Pt stated she is in pain and asking to get something for pain asap due to the ongoing issue and prolonged problem at hand please call back to advise asap thanks!      [id number] [place] - [address] AT [facility]  [address] [address]  Phone: [phone] Fax: [phone]

## 2024-03-25 DIAGNOSIS — M25.511 RIGHT SHOULDER PAIN, UNSPECIFIED CHRONICITY: Primary | ICD-10-CM

## 2024-04-08 ENCOUNTER — DOCUMENTATION ONLY (OUTPATIENT)
Dept: REHABILITATION | Facility: HOSPITAL | Age: 59
End: 2024-04-08

## 2024-04-08 NOTE — PROGRESS NOTES
Patient: Lori Herrera  Date of Session: 4/8/2024  MRN: [id number]  Lori Herrera did not attend his/her scheduled therapy appointment today and did not call to cancel nor reschedule. Physical therapy will follow up with patient at their next scheduled visit. No charges have been posted today.

## 2024-06-21 DIAGNOSIS — J30.89 NON-SEASONAL ALLERGIC RHINITIS DUE TO OTHER ALLERGIC TRIGGER: ICD-10-CM

## 2024-06-21 RX ORDER — MONTELUKAST SODIUM 10 MG/1
10 TABLET ORAL NIGHTLY
Qty: 90 TABLET | Refills: 1 | Status: CANCELLED | OUTPATIENT
Start: 2024-06-21

## 2024-06-21 RX ORDER — ALBUTEROL SULFATE 90 UG/1
4 AEROSOL, METERED RESPIRATORY (INHALATION) EVERY 6 HOURS PRN
Qty: 8.5 G | Refills: 1 | Status: CANCELLED | OUTPATIENT
Start: 2024-06-21

## 2024-06-21 NOTE — TELEPHONE ENCOUNTER
----- Message from Caitiln Philip sent at 6/21/2024  2:42 PM CDT -----  Regarding: Refill request  Contact: pt  Type:  RX Refill Request    Who Called: pt    Refill or New Rx:refill    RX Name and Strength:montelukast (SINGULAIR) 10 mg tablet  albuterol (PROVENTIL/VENTOLIN HFA) 90 mcg/actuation inhaler    How is the patient currently taking it? (ex. 1XDay):as directed    Is this a 30 day or 90 day RX:30    Preferred Pharmacy with phone number:  [id number] - [place], [place] - [address]. AT [address] [address]  Phone: [phone] Fax: [phone]    Local or Mail Order:local    Ordering Provider:susi    Would the patient rather a call back or a response via [facility]? Call back    Best Call Back Number:[phone]    Additional Information: refill request  Please advise.  Thank you.

## 2024-06-21 NOTE — TELEPHONE ENCOUNTER
No care due was identified.  [facility] Embedded Care Due Messages. Reference number: [id number].   6/21/2024 2:52:41 PM CDT

## 2024-06-21 NOTE — TELEPHONE ENCOUNTER
----- Message from Caitlin Palacios sent at 6/21/2024  2:39 PM CDT -----  Regarding: sooner appt  Contact: pt  Pt's appt was changed to be aug 1st but pt can not wait that long.  Pt's leg is still bothering her from the dog bite.  Circulation is not good in legs.  Pt asking to be seen sooner.      [phone]

## 2024-06-24 ENCOUNTER — PATIENT MESSAGE (OUTPATIENT)
Dept: FAMILY MEDICINE | Facility: CLINIC | Age: 59
End: 2024-06-24
Payer: MEDICAID

## 2024-06-26 DIAGNOSIS — Z12.31 OTHER SCREENING MAMMOGRAM: ICD-10-CM

## 2024-06-27 DIAGNOSIS — B96.89 ACUTE BACTERIAL SINUSITIS: ICD-10-CM

## 2024-06-27 DIAGNOSIS — J22 LOWER RESPIRATORY INFECTION: ICD-10-CM

## 2024-06-27 DIAGNOSIS — J01.90 ACUTE BACTERIAL SINUSITIS: ICD-10-CM

## 2024-06-27 RX ORDER — AZELASTINE 1 MG/ML
1 SPRAY, METERED NASAL 2 TIMES DAILY
Qty: 30 ML | Refills: 1 | Status: SHIPPED | OUTPATIENT
Start: 2024-06-27 | End: 2025-06-27

## 2024-07-02 ENCOUNTER — PATIENT MESSAGE (OUTPATIENT)
Dept: FAMILY MEDICINE | Facility: CLINIC | Age: 59
End: 2024-07-02

## 2024-07-02 ENCOUNTER — OFFICE VISIT (OUTPATIENT)
Dept: FAMILY MEDICINE | Facility: CLINIC | Age: 59
End: 2024-07-02
Payer: MEDICAID

## 2024-07-02 ENCOUNTER — HOSPITAL ENCOUNTER (OUTPATIENT)
Dept: RADIOLOGY | Facility: HOSPITAL | Age: 59
Discharge: HOME | End: 2024-07-02
Attending: FAMILY MEDICINE
Payer: MEDICAID

## 2024-07-02 VITALS
WEIGHT: 254.88 LBS | HEART RATE: 110 BPM | SYSTOLIC BLOOD PRESSURE: 126 MMHG | OXYGEN SATURATION: 97 % | BODY MASS INDEX: 43.75 KG/M2 | DIASTOLIC BLOOD PRESSURE: 64 MMHG

## 2024-07-02 DIAGNOSIS — R73.09 ABNORMAL GLUCOSE: ICD-10-CM

## 2024-07-02 DIAGNOSIS — M54.2 NECK PAIN: ICD-10-CM

## 2024-07-02 DIAGNOSIS — J30.89 NON-SEASONAL ALLERGIC RHINITIS DUE TO OTHER ALLERGIC TRIGGER: ICD-10-CM

## 2024-07-02 DIAGNOSIS — J20.8 ACUTE BRONCHITIS, BACTERIAL: Primary | ICD-10-CM

## 2024-07-02 DIAGNOSIS — R53.83 OTHER FATIGUE: ICD-10-CM

## 2024-07-02 DIAGNOSIS — M25.561 CHRONIC PAIN OF BOTH KNEES: ICD-10-CM

## 2024-07-02 DIAGNOSIS — I10 ESSENTIAL HYPERTENSION: ICD-10-CM

## 2024-07-02 DIAGNOSIS — G89.29 CHRONIC PAIN OF BOTH KNEES: ICD-10-CM

## 2024-07-02 DIAGNOSIS — E55.9 VITAMIN D DEFICIENCY: ICD-10-CM

## 2024-07-02 DIAGNOSIS — M54.50 CHRONIC BILATERAL LOW BACK PAIN WITHOUT SCIATICA: ICD-10-CM

## 2024-07-02 DIAGNOSIS — G89.29 CHRONIC BILATERAL LOW BACK PAIN WITHOUT SCIATICA: ICD-10-CM

## 2024-07-02 DIAGNOSIS — B96.89 ACUTE BRONCHITIS, BACTERIAL: Primary | ICD-10-CM

## 2024-07-02 DIAGNOSIS — R10.12 LEFT UPPER QUADRANT ABDOMINAL PAIN: ICD-10-CM

## 2024-07-02 DIAGNOSIS — M25.562 CHRONIC PAIN OF BOTH KNEES: ICD-10-CM

## 2024-07-02 DIAGNOSIS — M79.605 PAIN OF LEFT LOWER EXTREMITY: ICD-10-CM

## 2024-07-02 DIAGNOSIS — R22.42 LUMP OF SKIN OF LEFT LOWER EXTREMITY: ICD-10-CM
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
## 2024-07-02 PROCEDURE — 3078F DIAST BP <80 MM HG: CPT | Mod: CPTII,,, | Performed by: FAMILY MEDICINE

## 2024-07-02 PROCEDURE — 1160F RVW MEDS BY RX/DR IN RCRD: CPT | Mod: CPTII,,, | Performed by: FAMILY MEDICINE

## 2024-07-02 PROCEDURE — 3074F SYST BP LT 130 MM HG: CPT | Mod: CPTII,,, | Performed by: FAMILY MEDICINE

## 2024-07-02 PROCEDURE — 99214 OFFICE O/P EST MOD 30 MIN: CPT | Mod: S$PBB,,, | Performed by: FAMILY MEDICINE

## 2024-07-02 PROCEDURE — 72040 X-RAY EXAM NECK SPINE 2-3 VW: CPT | Mod: TC,FY,PO

## 2024-07-02 PROCEDURE — 72100 X-RAY EXAM L-S SPINE 2/3 VWS: CPT | Mod: TC,FY,PO

## 2024-07-02 PROCEDURE — 3061F NEG MICROALBUMINURIA REV: CPT | Mod: CPTII,,, | Performed by: FAMILY MEDICINE

## 2024-07-02 PROCEDURE — 4010F ACE/ARB THERAPY RXD/TAKEN: CPT | Mod: CPTII,,, | Performed by: FAMILY MEDICINE

## 2024-07-02 PROCEDURE — 72100 X-RAY EXAM L-S SPINE 2/3 VWS: CPT | Mod: 26,,, | Performed by: RADIOLOGY

## 2024-07-02 PROCEDURE — 1159F MED LIST DOCD IN RCRD: CPT | Mod: CPTII,,, | Performed by: FAMILY MEDICINE

## 2024-07-02 PROCEDURE — 99215 OFFICE O/P EST HI 40 MIN: CPT | Mod: PBBFAC,25,PO | Performed by: FAMILY MEDICINE

## 2024-07-02 PROCEDURE — 3008F BODY MASS INDEX DOCD: CPT | Mod: CPTII,,, | Performed by: FAMILY MEDICINE

## 2024-07-02 PROCEDURE — 3066F NEPHROPATHY DOC TX: CPT | Mod: CPTII,,, | Performed by: FAMILY MEDICINE

## 2024-07-02 PROCEDURE — 3051F HG A1C>EQUAL 7.0%<8.0%: CPT | Mod: CPTII,,, | Performed by: FAMILY MEDICINE

## 2024-07-02 PROCEDURE — 99999 PR PBB SHADOW E&M-EST. PATIENT-LVL V: CPT | Mod: PBBFAC,,, | Performed by: FAMILY MEDICINE

## 2024-07-02 PROCEDURE — 72040 X-RAY EXAM NECK SPINE 2-3 VW: CPT | Mod: 26,,, | Performed by: RADIOLOGY

## 2024-07-02 RX ORDER — ALBUTEROL SULFATE 90 UG/1
4 AEROSOL, METERED RESPIRATORY (INHALATION) EVERY 6 HOURS PRN
Qty: 8.5 G | Refills: 1 | Status: SHIPPED | OUTPATIENT
Start: 2024-07-02 | End: 2024-07-03

## 2024-07-02 RX ORDER — CYCLOBENZAPRINE HCL 10 MG
10 TABLET ORAL NIGHTLY PRN
Qty: 30 TABLET | Refills: 2 | Status: SHIPPED | OUTPATIENT
Start: 2024-07-02

## 2024-07-02 RX ORDER — DOXYCYCLINE 100 MG/1
100 CAPSULE ORAL 2 TIMES DAILY
Qty: 20 CAPSULE | Refills: 0 | Status: SHIPPED | OUTPATIENT
Start: 2024-07-02 | End: 2024-07-12

## 2024-07-02 RX ORDER — METHYLPREDNISOLONE 4 MG/1
TABLET ORAL
Qty: 21 TABLET | Refills: 0 | Status: SHIPPED | OUTPATIENT
Start: 2024-07-02 | End: 2024-07-23

## 2024-07-03 RX ORDER — ALBUTEROL SULFATE 90 UG/1
4 AEROSOL, METERED RESPIRATORY (INHALATION) EVERY 6 HOURS PRN
Qty: 8.5 G | Refills: 1 | Status: SHIPPED | OUTPATIENT
Start: 2024-07-03

## 2024-07-05 ENCOUNTER — LAB VISIT (OUTPATIENT)
Dept: LAB | Facility: HOSPITAL | Age: 59
End: 2024-07-05
Attending: FAMILY MEDICINE
Payer: MEDICAID

## 2024-07-05 ENCOUNTER — TELEPHONE (OUTPATIENT)
Dept: FAMILY MEDICINE | Facility: CLINIC | Age: 59
End: 2024-07-05
Payer: MEDICAID

## 2024-07-05 DIAGNOSIS — E55.9 VITAMIN D DEFICIENCY: ICD-10-CM

## 2024-07-05 DIAGNOSIS — I10 ESSENTIAL HYPERTENSION: ICD-10-CM

## 2024-07-05 DIAGNOSIS — R53.83 OTHER FATIGUE: ICD-10-CM

## 2024-07-05 DIAGNOSIS — G89.29 CHRONIC BILATERAL LOW BACK PAIN WITHOUT SCIATICA: ICD-10-CM

## 2024-07-05 DIAGNOSIS — M54.50 CHRONIC BILATERAL LOW BACK PAIN WITHOUT SCIATICA: ICD-10-CM

## 2024-07-05 DIAGNOSIS — M25.562 CHRONIC PAIN OF BOTH KNEES: ICD-10-CM

## 2024-07-05 DIAGNOSIS — R73.09 ABNORMAL GLUCOSE: ICD-10-CM

## 2024-07-05 DIAGNOSIS — M54.2 NECK PAIN: ICD-10-CM

## 2024-07-05 DIAGNOSIS — M79.605 PAIN OF LEFT LOWER EXTREMITY: ICD-10-CM

## 2024-07-05 DIAGNOSIS — G89.29 CHRONIC PAIN OF BOTH KNEES: ICD-10-CM

## 2024-07-05 DIAGNOSIS — M25.561 CHRONIC PAIN OF BOTH KNEES: ICD-10-CM

## 2024-07-05 LAB
ALBUMIN SERPL BCP-MCNC: 3.3 G/DL (ref 3.5–5.2)
ALP SERPL-CCNC: 103 U/L (ref 55–135)
ALT SERPL W/O P-5'-P-CCNC: 32 U/L (ref 10–44)
ANION GAP SERPL CALC-SCNC: 10 MMOL/L (ref 8–16)
AST SERPL-CCNC: 31 U/L (ref 10–40)
BASOPHILS # BLD AUTO: 0.05 K/UL (ref 0–0.2)
BASOPHILS NFR BLD: 0.7 % (ref 0–1.9)
BILIRUB SERPL-MCNC: 0.4 MG/DL (ref 0.1–1)
BUN SERPL-MCNC: 9 MG/DL (ref 6–20)
CALCIUM SERPL-MCNC: 9.3 MG/DL (ref 8.7–10.5)
CHLORIDE SERPL-SCNC: 103 MMOL/L (ref 95–110)
CHOLEST SERPL-MCNC: 193 MG/DL (ref 120–199)
CHOLEST/HDLC SERPL: 3.3 {RATIO} (ref 2–5)
CO2 SERPL-SCNC: 25 MMOL/L (ref 23–29)
CREAT SERPL-MCNC: 0.8 MG/DL (ref 0.5–1.4)
CRP SERPL-MCNC: 15.5 MG/L (ref 0–8.2)
D DIMER PPP IA.FEU-MCNC: 0.9 MG/L FEU
DIFFERENTIAL METHOD BLD: ABNORMAL
EOSINOPHIL # BLD AUTO: 0.2 K/UL (ref 0–0.5)
EOSINOPHIL NFR BLD: 3.1 % (ref 0–8)
ERYTHROCYTE [DISTWIDTH] IN BLOOD BY AUTOMATED COUNT: 13.2 % (ref 11.5–14.5)
ERYTHROCYTE [SEDIMENTATION RATE] IN BLOOD BY PHOTOMETRIC METHOD: 36 MM/HR (ref 0–36)
EST. GFR  (NO RACE VARIABLE): >60 ML/MIN/1.73 M^2
ESTIMATED AVG GLUCOSE: 163 MG/DL (ref 68–131)
GLUCOSE SERPL-MCNC: 153 MG/DL (ref 70–110)
HBA1C MFR BLD: 7.3 % (ref 4–5.6)
HCT VFR BLD AUTO: 42.7 % (ref 37–48.5)
HDLC SERPL-MCNC: 58 MG/DL (ref 40–75)
HDLC SERPL: 30.1 % (ref 20–50)
HGB BLD-MCNC: 13.9 G/DL (ref 12–16)
IMM GRANULOCYTES # BLD AUTO: 0.03 K/UL (ref 0–0.04)
IMM GRANULOCYTES NFR BLD AUTO: 0.4 % (ref 0–0.5)
INSULIN COLLECTION INTERVAL: NORMAL
INSULIN SERPL-ACNC: 20.3 UU/ML
LDLC SERPL CALC-MCNC: 114.4 MG/DL (ref 63–159)
LYMPHOCYTES # BLD AUTO: 2.5 K/UL (ref 1–4.8)
LYMPHOCYTES NFR BLD: 34.2 % (ref 18–48)
MCH RBC QN AUTO: 31.1 PG (ref 27–31)
MCHC RBC AUTO-ENTMCNC: 32.6 G/DL (ref 32–36)
MCV RBC AUTO: 96 FL (ref 82–98)
MONOCYTES # BLD AUTO: 0.4 K/UL (ref 0.3–1)
MONOCYTES NFR BLD: 5.1 % (ref 4–15)
NEUTROPHILS # BLD AUTO: 4.2 K/UL (ref 1.8–7.7)
NEUTROPHILS NFR BLD: 56.5 % (ref 38–73)
NONHDLC SERPL-MCNC: 135 MG/DL
NRBC BLD-RTO: 0 /100 WBC
PLATELET # BLD AUTO: 193 K/UL (ref 150–450)
PMV BLD AUTO: 12.1 FL (ref 9.2–12.9)
POTASSIUM SERPL-SCNC: 4.2 MMOL/L (ref 3.5–5.1)
PROT SERPL-MCNC: 7.2 G/DL (ref 6–8.4)
RBC # BLD AUTO: 4.47 M/UL (ref 4–5.4)
RHEUMATOID FACT SERPL-ACNC: <13 IU/ML (ref 0–15)
SODIUM SERPL-SCNC: 138 MMOL/L (ref 136–145)
TRIGL SERPL-MCNC: 103 MG/DL (ref 30–150)
TSH SERPL DL<=0.005 MIU/L-ACNC: 0.97 UIU/ML (ref 0.4–4)
WBC # BLD AUTO: 7.43 K/UL (ref 3.9–12.7)

## 2024-07-05 PROCEDURE — 86140 C-REACTIVE PROTEIN: CPT | Performed by: FAMILY MEDICINE

## 2024-07-05 PROCEDURE — 86431 RHEUMATOID FACTOR QUANT: CPT | Performed by: FAMILY MEDICINE

## 2024-07-05 PROCEDURE — 85379 FIBRIN DEGRADATION QUANT: CPT | Performed by: FAMILY MEDICINE

## 2024-07-05 PROCEDURE — 83036 HEMOGLOBIN GLYCOSYLATED A1C: CPT | Performed by: FAMILY MEDICINE

## 2024-07-05 PROCEDURE — 84443 ASSAY THYROID STIM HORMONE: CPT | Performed by: FAMILY MEDICINE

## 2024-07-05 PROCEDURE — 86038 ANTINUCLEAR ANTIBODIES: CPT | Performed by: FAMILY MEDICINE

## 2024-07-05 PROCEDURE — 85025 COMPLETE CBC W/AUTO DIFF WBC: CPT | Performed by: FAMILY MEDICINE

## 2024-07-05 PROCEDURE — 85652 RBC SED RATE AUTOMATED: CPT | Performed by: FAMILY MEDICINE

## 2024-07-05 PROCEDURE — 80053 COMPREHEN METABOLIC PANEL: CPT | Performed by: FAMILY MEDICINE

## 2024-07-05 PROCEDURE — 83525 ASSAY OF INSULIN: CPT | Performed by: FAMILY MEDICINE

## 2024-07-05 PROCEDURE — 82652 VIT D 1 25-DIHYDROXY: CPT | Performed by: FAMILY MEDICINE

## 2024-07-05 PROCEDURE — 36415 COLL VENOUS BLD VENIPUNCTURE: CPT | Mod: PO | Performed by: FAMILY MEDICINE

## 2024-07-05 PROCEDURE — 80061 LIPID PANEL: CPT | Performed by: FAMILY MEDICINE

## 2024-07-05 NOTE — TELEPHONE ENCOUNTER
----- Message from Caroline Johnson DNP, APRN sent at 7/5/2024 12:48 PM CDT -----  Please notify I am reviewing results in absence of PCP. She was ordered doxycycline on 07/02/2024 which will be appropriate for her UTI as well. Just be sure to take until all gone.

## 2024-07-05 NOTE — TELEPHONE ENCOUNTER
Inform patient of Abt ordered for her UTI and the Recommendation per KRISTOFER Johnson DNP. Patient verbalized her understanding.

## 2024-07-06 DIAGNOSIS — E11.65 TYPE 2 DIABETES MELLITUS WITH HYPERGLYCEMIA, WITHOUT LONG-TERM CURRENT USE OF INSULIN: Primary | ICD-10-CM

## 2024-07-06 RX ORDER — METFORMIN HYDROCHLORIDE 500 MG/1
500 TABLET, EXTENDED RELEASE ORAL
Qty: 90 TABLET | Refills: 3 | Status: SHIPPED | OUTPATIENT
Start: 2024-07-06 | End: 2025-07-06

## 2024-07-06 NOTE — PROGRESS NOTES
Assessment:       1. Acute bronchitis, bacterial    2. Left upper quadrant abdominal pain    3. Dog bite, sequela    4. Non-seasonal allergic rhinitis due to other allergic trigger    5. Vitamin D deficiency    6. Abnormal glucose    7. Essential hypertension    8. Other fatigue    9. Lump of skin of left lower extremity    10. Chronic bilateral low back pain without sciatica    11. Neck pain    12. Pain of left lower extremity    13. Chronic pain of both knees        Assessment & Plan  Acute bronchitis, bacterial:  New problem, next visit workup  -     doxycycline (MONODOX) 100 MG capsule; Take 1 capsule (100 mg total) by mouth 2 (two) times daily. for 10 days  Dispense: 20 capsule; Refill: 0  -     methylPREDNISolone (MEDROL DOSEPACK) 4 mg tablet; use as directed on package  Dispense: 21 tablet; Refill: 0  -    albuterol (PROVENTIL/VENTOLIN HFA) 90 mcg/actuation inhaler; Inhale 4 puffs into the lungs every 6 (six) hours as needed for Wheezing (or cough). Rescue  Dispense: 8.5 g; Refill: 1  -     albuterol (PROVENTIL/VENTOLIN HFA) 90 mcg/actuation inhaler; Inhale 4 puffs into the lungs every 6 (six) hours as needed for Wheezing (or cough). Rescue  Dispense: 8.5 g; Refill: 1    Left upper quadrant abdominal pain: New problem workup needed  -     Urinalysis; Future; Expected date: 07/02/2024  -     Urine culture; Future; Expected date: 07/02/2024    Dog bite, sequela: New problem workup needed    Non-seasonal allergic rhinitis due to other allergic trigger: Worsening  -     Discontinue: albuterol (PROVENTIL/VENTOLIN HFA) 90 mcg/actuation inhaler; Inhale 4 puffs into the lungs every 6 (six) hours as needed for Wheezing (or cough). Rescue  Dispense: 8.5 g; Refill: 1  -     albuterol (PROVENTIL/VENTOLIN HFA) 90 mcg/actuation inhaler; Inhale 4 puffs into the lungs every 6 (six) hours as needed for Wheezing (or cough). Rescue  Dispense: 8.5 g; Refill: 1    Vitamin D deficiency: Uncontrolled  -     Calcitriol; Future;  Expected date: 07/02/2024    Abnormal glucose: Worsening  -     Hemoglobin A1C; Future; Expected date: 07/02/2024  -     Insulin, Random; Future; Expected date: 07/02/2024    Essential hypertension: Stable  -     Comprehensive Metabolic Panel; Future; Expected date: 07/02/2024  -     Lipid Panel; Future; Expected date: 07/02/2024  -     Microalbumin/Creatinine Ratio, Urine; Future; Expected date: 07/02/2024    Other fatigue: Worsening  -     CBC Auto Differential; Future; Expected date: 07/02/2024  -     TSH; Future; Expected date: 07/02/2024    Lump of skin of left lower extremity: worsening  -     US Extremity Non Vascular Complete Left; Future; Expected date: 07/02/2024    Chronic bilateral low back pain without sciatica: Worsening  -     X-Ray Lumbar Spine AP And Lateral; Future; Expected date: 07/02/2024  -     Sedimentation rate; Future; Expected date: 07/02/2024  -     C-Reactive Protein; Future; Expected date: 07/02/2024    Neck pain: Worsening  -     X-Ray Cervical Spine AP And Lateral; Future; Expected date: 07/02/2024  -     Sedimentation rate; Future; Expected date: 07/02/2024  -     C-Reactive Protein; Future; Expected date: 07/02/2024  -     cyclobenzaprine (FLEXERIL) 10 MG tablet; Take 1 tablet (10 mg total) by mouth nightly as needed for Muscle spasms.  Dispense: 30 tablet; Refill: 2  Pain of left lower extremity: Worsening  -     D-DIMER, QUANTITATIVE; Future; Expected date: 07/02/2024    Chronic pain of both knees worsening  -     Sedimentation rate; Future; Expected date: 07/02/2024  -     C-Reactive Protein; Future; Expected date: 07/02/2024  -     JOCELYNN; Future; Expected date: 07/02/2024  -     Rheumatoid Factor; Future; Expected date: 07/02/2024              Will start patient antibiotics, medications were refill, x-rays were ordered, ER warning signs given the patient, habits, start taking probiotics over-the-counter.  The patient's BMI has been recorded in the chart. The patient has been  provided educational materials regarding the benefits of attaining and maintaining a normal weight. We will continue to address and follow this issue during follow up visits.   Patient agreed with assessment and plan. Patient verbalized understanding.     Subjective:       Patient ID: Lori Herrera is a 58 y.o. female.    Chief Complaint: Follow-up (Pt following up on dog bite from December pt states leg still hurts, pt also following up on sciatic pain, left flank pain)    HPI  History of Present Illness  The patient presents for evaluation of multiple medical concerns.    The patient reports experiencing a sore throat, accompanied by a sensation of hot and cold sensations upon awakening. She also experiences wheezing, necessitating the use of her inhaler.    The patient sustained a dog bite to her leg, which continues to cause significant discomfort. Despite the healing process, the area remains tender. She has sought treatment from a wound specialist, who performed a procedure and instructed her on self-care strategies.    The patient experiences abdominal pain, which she attributes to a previous fall that resulted in rib fractures at 9 and 10. She has been attempting to incorporate healthier food into her routine.   She is allergic to PENICILLIN.    Past medical history, past social history was reviewed and discussed with the patient.    Review of Systems   HENT:  Positive for congestion and postnasal drip.    Respiratory:  Positive for cough and wheezing.    Cardiovascular:  Negative for chest pain.   Gastrointestinal:  Positive for abdominal distention and abdominal pain.   Musculoskeletal:  Positive for arthralgias and back pain.       Objective:      Physical Exam  Constitutional:       General: She is in acute distress.      Appearance: Normal appearance.   HENT:      Head: Normocephalic and atraumatic.   Cardiovascular:      Rate and Rhythm: Normal rate and regular rhythm.      Heart sounds: Normal heart  sounds.   Pulmonary:      Effort: Pulmonary effort is normal. No respiratory distress.      Breath sounds: Normal breath sounds. No wheezing.   Chest:      Chest wall: Tenderness present.   Abdominal:      General: Abdomen is flat. Bowel sounds are normal.      Tenderness: There is abdominal tenderness (Left flank area).   Musculoskeletal:         General: Tenderness (Neck and lower back) present.      Comments: Bilateral knees   Skin:     Findings: Bruising present.   Neurological:      Mental Status: She is alert.         Physical Exam      Vital Signs  Blood pressure measures 122/64.     Results       This note was generated with the assistance of ambient listening technology. Verbal consent was obtained by the patient and accompanying visitor(s) for the recording of patient appointment to facilitate this note. I attest to having reviewed and edited the generated note for accuracy, though some syntax or spelling errors may persist. Please contact the author of this note for any clarification.

## 2024-07-08 DIAGNOSIS — A49.9 BACTERIAL UTI: Primary | ICD-10-CM

## 2024-07-08 DIAGNOSIS — N39.0 BACTERIAL UTI: Primary | ICD-10-CM

## 2024-07-08 LAB
1,25(OH)2D3 SERPL-MCNC: 54 PG/ML (ref 20–79)
ANA SER QL IF: NORMAL

## 2024-07-08 RX ORDER — NITROFURANTOIN 25; 75 MG/1; MG/1
100 CAPSULE ORAL 2 TIMES DAILY
Qty: 14 CAPSULE | Refills: 0 | Status: SHIPPED | OUTPATIENT
Start: 2024-07-08 | End: 2024-07-15

## 2024-07-10 ENCOUNTER — PATIENT MESSAGE (OUTPATIENT)
Dept: FAMILY MEDICINE | Facility: CLINIC | Age: 59
End: 2024-07-10
Payer: MEDICAID

## 2024-07-22 ENCOUNTER — TELEPHONE (OUTPATIENT)
Dept: PAIN MEDICINE | Facility: CLINIC | Age: 59
End: 2024-07-22
Payer: MEDICAID

## 2024-07-22 ENCOUNTER — PATIENT MESSAGE (OUTPATIENT)
Dept: PAIN MEDICINE | Facility: CLINIC | Age: 59
End: 2024-07-22
Payer: MEDICAID

## 2024-07-22 ENCOUNTER — PATIENT MESSAGE (OUTPATIENT)
Dept: ADMINISTRATIVE | Facility: HOSPITAL | Age: 59
End: 2024-07-22
Payer: MEDICAID

## 2024-07-22 ENCOUNTER — TELEPHONE (OUTPATIENT)
Dept: FAMILY MEDICINE | Facility: CLINIC | Age: 59
End: 2024-07-22
Payer: MEDICAID

## 2024-07-22 NOTE — TELEPHONE ENCOUNTER
----- Message from Perla Soliman sent at 7/19/2024  4:45 PM CDT -----  Regarding: Call back  Type:  Needs Medical Advice    Who Called: Pt    Would the patient rather a call back or a response via [facility]? Call back    Best Call Back Number: [phone]    Additional Information: Pt is requesting a call back about one of her meds cyclobenzaprine (FLEXERIL) 10 MG tablet . Thank you

## 2024-07-22 NOTE — TELEPHONE ENCOUNTER
----- Message from Joanbenny Mcghee sent at 7/22/2024  4:08 PM CDT -----  Contact: Self  Type:  Sooner Appointment Request    Caller is requesting a sooner appointment.  Caller declined first available appointment listed below.  Caller will not accept being placed on the waitlist and is requesting a message be sent to doctor.    Name of Caller:  Patient  When is the first available appointment?  N/A  Symptoms:  back pain, sciatic nerve pain  Would the patient rather a call back or a response via [facility]? Call  Best Call Back Number:  [phone]  Additional Information:  Pt was told by the doctor to call back if nothing shows available and we can help get her in, I let her know the medicaid panel was full and she denied being transferred to the Medicaid Access panel. Can we please call pt back to advise. Thank You

## 2024-07-23 ENCOUNTER — HOSPITAL ENCOUNTER (OUTPATIENT)
Dept: RADIOLOGY | Facility: HOSPITAL | Age: 59
Discharge: HOME | End: 2024-07-23
Attending: FAMILY MEDICINE
Payer: MEDICAID

## 2024-07-23 ENCOUNTER — OFFICE VISIT (OUTPATIENT)
Dept: FAMILY MEDICINE | Facility: CLINIC | Age: 59
End: 2024-07-23
Payer: MEDICAID

## 2024-07-23 ENCOUNTER — TELEPHONE (OUTPATIENT)
Facility: CLINIC | Age: 59
End: 2024-07-23
Payer: MEDICAID

## 2024-07-23 VITALS
BODY MASS INDEX: 43.28 KG/M2 | HEART RATE: 86 BPM | DIASTOLIC BLOOD PRESSURE: 72 MMHG | SYSTOLIC BLOOD PRESSURE: 124 MMHG | RESPIRATION RATE: 18 BRPM | OXYGEN SATURATION: 96 % | WEIGHT: 253.5 LBS | HEIGHT: 64 IN

## 2024-07-23 DIAGNOSIS — E11.65 TYPE 2 DIABETES MELLITUS WITH HYPERGLYCEMIA, WITHOUT LONG-TERM CURRENT USE OF INSULIN: Primary | ICD-10-CM

## 2024-07-23 DIAGNOSIS — Z12.31 ENCOUNTER FOR SCREENING MAMMOGRAM FOR MALIGNANT NEOPLASM OF BREAST: ICD-10-CM

## 2024-07-23 DIAGNOSIS — E78.49 OTHER HYPERLIPIDEMIA: ICD-10-CM

## 2024-07-23 DIAGNOSIS — R07.81 RIB PAIN: ICD-10-CM

## 2024-07-23 DIAGNOSIS — I10 ESSENTIAL HYPERTENSION: ICD-10-CM

## 2024-07-23 DIAGNOSIS — Z12.11 SCREENING FOR COLON CANCER: ICD-10-CM

## 2024-07-23 DIAGNOSIS — M54.2 NECK PAIN: ICD-10-CM

## 2024-07-23 PROCEDURE — 3061F NEG MICROALBUMINURIA REV: CPT | Mod: CPTII,,, | Performed by: FAMILY MEDICINE

## 2024-07-23 PROCEDURE — 3078F DIAST BP <80 MM HG: CPT | Mod: CPTII,,, | Performed by: FAMILY MEDICINE

## 2024-07-23 PROCEDURE — 3066F NEPHROPATHY DOC TX: CPT | Mod: CPTII,,, | Performed by: FAMILY MEDICINE

## 2024-07-23 PROCEDURE — 99214 OFFICE O/P EST MOD 30 MIN: CPT | Mod: S$PBB,,, | Performed by: FAMILY MEDICINE

## 2024-07-23 PROCEDURE — G2211 COMPLEX E/M VISIT ADD ON: HCPCS | Mod: S$PBB,,, | Performed by: FAMILY MEDICINE

## 2024-07-23 PROCEDURE — 1159F MED LIST DOCD IN RCRD: CPT | Mod: CPTII,,, | Performed by: FAMILY MEDICINE

## 2024-07-23 PROCEDURE — 99999 PR PBB SHADOW E&M-EST. PATIENT-LVL IV: CPT | Mod: PBBFAC,,, | Performed by: FAMILY MEDICINE

## 2024-07-23 PROCEDURE — 4010F ACE/ARB THERAPY RXD/TAKEN: CPT | Mod: CPTII,,, | Performed by: FAMILY MEDICINE

## 2024-07-23 PROCEDURE — 71100 X-RAY EXAM RIBS UNI 2 VIEWS: CPT | Mod: TC,FY,PO,LT

## 2024-07-23 PROCEDURE — 3074F SYST BP LT 130 MM HG: CPT | Mod: CPTII,,, | Performed by: FAMILY MEDICINE

## 2024-07-23 PROCEDURE — 99214 OFFICE O/P EST MOD 30 MIN: CPT | Mod: PBBFAC,25,PO | Performed by: FAMILY MEDICINE

## 2024-07-23 PROCEDURE — 3051F HG A1C>EQUAL 7.0%<8.0%: CPT | Mod: CPTII,,, | Performed by: FAMILY MEDICINE

## 2024-07-23 PROCEDURE — 71100 X-RAY EXAM RIBS UNI 2 VIEWS: CPT | Mod: 26,LT,, | Performed by: RADIOLOGY

## 2024-07-23 PROCEDURE — 3008F BODY MASS INDEX DOCD: CPT | Mod: CPTII,,, | Performed by: FAMILY MEDICINE

## 2024-07-23 RX ORDER — SEMAGLUTIDE 0.68 MG/ML
INJECTION, SOLUTION SUBCUTANEOUS
Qty: 3 ML | Refills: 1 | Status: SHIPPED | OUTPATIENT
Start: 2024-07-23 | End: 2024-10-21

## 2024-07-23 RX ORDER — CYCLOBENZAPRINE HCL 10 MG
10 TABLET ORAL 2 TIMES DAILY PRN
Qty: 60 TABLET | Refills: 2 | Status: SHIPPED | OUTPATIENT
Start: 2024-07-23

## 2024-07-23 NOTE — TELEPHONE ENCOUNTER
----- Message from Joan Litzy sent at 7/22/2024  4:13 PM CDT -----  Contact: Self  Type:  Sooner Appointment Request    Caller is requesting a sooner appointment.  Caller declined first available appointment listed below.  Caller will not accept being placed on the waitlist and is requesting a message be sent to doctor.    Name of Caller:  Patient  When is the first available appointment?  N/a  Symptoms:  black mole that keeps changing colors and sizes  Would the patient rather a call back or a response via [facility]? Call  Best Call Back Number:  [phone]  Additional Information:  I tried to let her know we weren't taking medicaid at the time that the panel was full, but she wanted me to send a msg to see if she could be put on list once open and refused to be transferred to medicaid access line. Can we please call pt back to advise. Thank you

## 2024-07-24 ENCOUNTER — PATIENT MESSAGE (OUTPATIENT)
Dept: FAMILY MEDICINE | Facility: CLINIC | Age: 59
End: 2024-07-24
Payer: MEDICAID

## 2024-07-24 NOTE — PROGRESS NOTES
Assessment:       1. Type 2 diabetes mellitus with hyperglycemia, without long-term current use of insulin    2. Other hyperlipidemia    3. Essential hypertension    4. Neck pain    5. Rib pain    6. Encounter for screening mammogram for malignant neoplasm of breast    7. Screening for colon cancer        Assessment & Plan  Type 2 diabetes mellitus with hyperglycemia, without long-term current use of insulin: Uncontrolled  -     semaglutide (OZEMPIC) 0.25 mg or 0.5 mg (2 mg/3 mL) pen injector; Inject 0.25 mg into the skin every 7 days for 30 days, THEN 0.5 mg every 7 days.  Dispense: 3 mL; Refill: 1    Other hyperlipidemia: Uncontrolled    Essential hypertension: Stable    Neck pain: Worsening  -     Ambulatory referral/consult to Physical/Occupational Therapy; Future; Expected date: 07/30/2024  -     cyclobenzaprine (FLEXERIL) 10 MG tablet; Take 1 tablet (10 mg total) by mouth 2 (two) times daily as needed for Muscle spasms.  Dispense: 60 tablet; Refill: 2    Rib pain: Worsening  -     X-Ray Ribs 2 View Left; Future; Expected date: 07/23/2024    Encounter for screening mammogram for malignant neoplasm of breast    Screening for colon cancer  -     Case Request Endoscopy: COLONOSCOPY       Her JOCELYNN for lupus and rheumatoid factor were negative, but her CRP was elevated at 15.5 and sedimentation rate was elevated at 36. Her D-dimer was high, thyroid function was normal, cholesterol levels were good, and liver enzymes returned to normal. Her hemoglobin was stable, but her fasting insulin was slightly elevated. Her A1c indicated diabetes. Her blood pressure and cholesterol levels were within normal range today. She is due for a colonoscopy. She was advised to limit her sugar intake. Ozempic 0.25 mg was prescribed to be administered every 7 days for a 28-day period, then increased to 0.5 mg after 4 weeks. The potential side effects of the medication were discussed. Physical therapy for her neck was ordered. Rib x-rays  were ordered. A colonoscopy was ordered. Flexeril was prescribed to be taken twice daily.     Follow-up  She will follow up with the physician assistant in 4 weeks and with me in 3 months.     Visit today included increased complexity associated with the care of the episodic problem hypertension, hyperlipidemia, diabetes addressed and managing the longitudinal care of the patient due to the serious and/or complex managed problem(s) hypertension, hyperlipidemia, diabetes.     The patient's BMI has been recorded in the chart. The patient has been provided educational materials regarding the benefits of attaining and maintaining a normal weight. We will continue to address and follow this issue during follow up visits.   Patient agreed with assessment and plan. Patient verbalized understanding.     Subjective:       Patient ID: Lori Herrera is a 58 y.o. female.    Chief Complaint: Follow-up (Pt would like to discuss medication changes)    HPI  History of Present Illness  The patient presents for evaluation of multiple medical concerns.    She reports feeling slightly better since her last visit and is unsure if she requires another antibiotic or a steroid pack. She has attempted to schedule a pain clinic and dermatology appointment, but due to insurance issues, she has been unable to secure an appointment. She is making efforts to lose weight and has reduced her consumption of Pepsi. An ultrasound of her legs has been scheduled for Tuesday. She has been prescribed metformin, but has not started taking it. She has no history of pancreatitis or thyroid cancer. She is due to receive her albuterol inhaler today. She is seeking physical therapy for her neck and is due for a colonoscopy. She occasionally takes Flexeril, which she finds helpful, but requires a refill. She also takes probiotics and cranberry tablets.   Her father had rheumatoid arthritis. She has a family history of cancer.     Past medical history, past  social history was reviewed and discussed with the patient.    Review of Systems   HENT:  Negative for congestion.    Respiratory:  Negative for shortness of breath.    Cardiovascular:  Positive for chest pain. Negative for leg swelling.   Gastrointestinal:  Negative for abdominal distention and abdominal pain.   Musculoskeletal:  Positive for arthralgias and back pain.       Objective:      Physical Exam    Physical Exam  Vital Signs  Patient's height is 5 feet 4 inches.  The patient is in no acute distress, lungs are clear to auscultation, has tenderness to palpation on the left upper quadrant and left chest area, extremity has presence of no edema, has not tenderness to palpation on the sinuses, throat was not red.  Results  Laboratory Studies  JOCELYNN for lupus was negative. Rheumatoid factor for rheumatoid arthritis was negative. CRP increased from 8.3 to 15.5. Sedimentation rate increased from 6 to 36. D-dimer was high. Thyroid function was normal. Cholesterol levels were good. Blood sugar was 153. Liver enzymes returned to normal. Hemoglobin was stable. Fasting insulin was slightly high. A1c indicated diabetes.     This note was generated with the assistance of ambient listening technology. Verbal consent was obtained by the patient and accompanying visitor(s) for the recording of patient appointment to facilitate this note. I attest to having reviewed and edited the generated note for accuracy, though some syntax or spelling errors may persist. Please contact the author of this note for any clarification.

## 2024-07-25 ENCOUNTER — HOSPITAL ENCOUNTER (OUTPATIENT)
Dept: RADIOLOGY | Facility: HOSPITAL | Age: 59
Discharge: HOME | End: 2024-07-25
Attending: FAMILY MEDICINE
Payer: MEDICAID

## 2024-07-25 DIAGNOSIS — R22.42 LUMP OF SKIN OF LEFT LOWER EXTREMITY: ICD-10-CM

## 2024-07-25 DIAGNOSIS — Z12.31 OTHER SCREENING MAMMOGRAM: ICD-10-CM

## 2024-07-25 DIAGNOSIS — M79.605 PAIN OF LEFT LOWER EXTREMITY: ICD-10-CM

## 2024-07-25 PROCEDURE — 77067 SCR MAMMO BI INCL CAD: CPT | Mod: 26,,, | Performed by: RADIOLOGY

## 2024-07-25 PROCEDURE — 76882 US LMTD JT/FCL EVL NVASC XTR: CPT | Mod: TC,PO,LT

## 2024-07-25 PROCEDURE — 93971 EXTREMITY STUDY: CPT | Mod: 26,LT,, | Performed by: STUDENT IN AN ORGANIZED HEALTH CARE EDUCATION/TRAINING PROGRAM

## 2024-07-25 PROCEDURE — 77067 SCR MAMMO BI INCL CAD: CPT | Mod: TC,PO

## 2024-07-25 PROCEDURE — 93971 EXTREMITY STUDY: CPT | Mod: TC,PO,LT

## 2024-07-25 PROCEDURE — 76882 US LMTD JT/FCL EVL NVASC XTR: CPT | Mod: 26,LT,, | Performed by: STUDENT IN AN ORGANIZED HEALTH CARE EDUCATION/TRAINING PROGRAM

## 2024-07-25 PROCEDURE — 77063 BREAST TOMOSYNTHESIS BI: CPT | Mod: 26,,, | Performed by: RADIOLOGY

## 2024-07-25 PROCEDURE — 77063 BREAST TOMOSYNTHESIS BI: CPT | Mod: TC,PO

## 2024-07-29 ENCOUNTER — TELEPHONE (OUTPATIENT)
Dept: GASTROENTEROLOGY | Facility: CLINIC | Age: 59
End: 2024-07-29
Payer: MEDICAID

## 2024-07-29 DIAGNOSIS — L03.116 CELLULITIS OF LEFT LOWER EXTREMITY: Primary | ICD-10-CM

## 2024-07-29 RX ORDER — SULFAMETHOXAZOLE AND TRIMETHOPRIM 800; 160 MG/1; MG/1
1 TABLET ORAL 2 TIMES DAILY
Qty: 20 TABLET | Refills: 0 | Status: SHIPPED | OUTPATIENT
Start: 2024-07-29 | End: 2024-08-08

## 2024-07-29 NOTE — TELEPHONE ENCOUNTER
Vmail left asking pt to return call to schedule scope. Call back# provided. Mychart message sent as well

## 2024-08-07 DIAGNOSIS — R60.0 LOCALIZED EDEMA: ICD-10-CM

## 2024-08-07 DIAGNOSIS — I10 ESSENTIAL HYPERTENSION: ICD-10-CM

## 2024-08-08 RX ORDER — HYDROCHLOROTHIAZIDE 12.5 MG/1
12.5 TABLET ORAL DAILY
Qty: 90 TABLET | Refills: 3 | Status: SHIPPED | OUTPATIENT
Start: 2024-08-08

## 2024-08-19 ENCOUNTER — TELEPHONE (OUTPATIENT)
Dept: FAMILY MEDICINE | Facility: CLINIC | Age: 59
End: 2024-08-19
Payer: MEDICAID

## 2024-08-19 NOTE — TELEPHONE ENCOUNTER
Called pt and spoke, spoke to her about her appointment. Still having UTI symptoms. She is requested a US on day of appointment

## 2024-08-19 NOTE — TELEPHONE ENCOUNTER
----- Message from Dillon Joseph sent at 8/19/2024 11:47 AM CDT -----  Regarding: return call  Contact: patient  Type:  Patient Returning Call    Who Called:patient  Who Left Message for Patient:office nurse  Does the patient know what this is regarding?:appointment/ medication   Would the patient rather a call back or a response via [facility]?   Best Call Back Number:[phone]  Additional Information:

## 2024-08-29 ENCOUNTER — PATIENT MESSAGE (OUTPATIENT)
Dept: FAMILY MEDICINE | Facility: CLINIC | Age: 59
End: 2024-08-29
Payer: MEDICAID

## 2024-09-03 ENCOUNTER — LAB VISIT (OUTPATIENT)
Dept: LAB | Facility: HOSPITAL | Age: 59
End: 2024-09-03
Attending: FAMILY MEDICINE
Payer: MEDICAID

## 2024-09-03 ENCOUNTER — OFFICE VISIT (OUTPATIENT)
Dept: FAMILY MEDICINE | Facility: CLINIC | Age: 59
End: 2024-09-03
Payer: MEDICAID

## 2024-09-03 ENCOUNTER — TELEPHONE (OUTPATIENT)
Dept: FAMILY MEDICINE | Facility: CLINIC | Age: 59
End: 2024-09-03
Payer: MEDICAID

## 2024-09-03 VITALS
BODY MASS INDEX: 43.87 KG/M2 | HEART RATE: 103 BPM | WEIGHT: 256.94 LBS | SYSTOLIC BLOOD PRESSURE: 128 MMHG | DIASTOLIC BLOOD PRESSURE: 74 MMHG | HEIGHT: 64 IN | OXYGEN SATURATION: 96 % | RESPIRATION RATE: 18 BRPM

## 2024-09-03 DIAGNOSIS — R30.0 DYSURIA: ICD-10-CM

## 2024-09-03 DIAGNOSIS — I10 ESSENTIAL HYPERTENSION: ICD-10-CM

## 2024-09-03 DIAGNOSIS — G89.29 CHRONIC LEFT-SIDED LOW BACK PAIN WITH LEFT-SIDED SCIATICA: ICD-10-CM

## 2024-09-03 DIAGNOSIS — R06.02 SHORTNESS OF BREATH: Primary | ICD-10-CM

## 2024-09-03 DIAGNOSIS — E66.01 CLASS 3 SEVERE OBESITY DUE TO EXCESS CALORIES WITH SERIOUS COMORBIDITY AND BODY MASS INDEX (BMI) OF 40.0 TO 44.9 IN ADULT: ICD-10-CM

## 2024-09-03 DIAGNOSIS — M54.42 CHRONIC LEFT-SIDED LOW BACK PAIN WITH LEFT-SIDED SCIATICA: ICD-10-CM

## 2024-09-03 DIAGNOSIS — M79.605 PAIN OF LEFT LOWER EXTREMITY: ICD-10-CM

## 2024-09-03 DIAGNOSIS — E11.65 TYPE 2 DIABETES MELLITUS WITH HYPERGLYCEMIA, WITHOUT LONG-TERM CURRENT USE OF INSULIN: ICD-10-CM

## 2024-09-03 PROCEDURE — 99214 OFFICE O/P EST MOD 30 MIN: CPT | Mod: S$PBB,,, | Performed by: FAMILY MEDICINE

## 2024-09-03 PROCEDURE — 1159F MED LIST DOCD IN RCRD: CPT | Mod: CPTII,,, | Performed by: FAMILY MEDICINE

## 2024-09-03 PROCEDURE — 3051F HG A1C>EQUAL 7.0%<8.0%: CPT | Mod: CPTII,,, | Performed by: FAMILY MEDICINE

## 2024-09-03 PROCEDURE — 99214 OFFICE O/P EST MOD 30 MIN: CPT | Mod: PBBFAC,PO | Performed by: FAMILY MEDICINE

## 2024-09-03 PROCEDURE — 3061F NEG MICROALBUMINURIA REV: CPT | Mod: CPTII,,, | Performed by: FAMILY MEDICINE

## 2024-09-03 PROCEDURE — 3078F DIAST BP <80 MM HG: CPT | Mod: CPTII,,, | Performed by: FAMILY MEDICINE

## 2024-09-03 PROCEDURE — 3008F BODY MASS INDEX DOCD: CPT | Mod: CPTII,,, | Performed by: FAMILY MEDICINE

## 2024-09-03 PROCEDURE — 3074F SYST BP LT 130 MM HG: CPT | Mod: CPTII,,, | Performed by: FAMILY MEDICINE

## 2024-09-03 PROCEDURE — 4010F ACE/ARB THERAPY RXD/TAKEN: CPT | Mod: CPTII,,, | Performed by: FAMILY MEDICINE

## 2024-09-03 PROCEDURE — 3066F NEPHROPATHY DOC TX: CPT | Mod: CPTII,,, | Performed by: FAMILY MEDICINE

## 2024-09-03 PROCEDURE — 99999 PR PBB SHADOW E&M-EST. PATIENT-LVL IV: CPT | Mod: PBBFAC,,, | Performed by: FAMILY MEDICINE

## 2024-09-03 RX ORDER — HYDROCODONE BITARTRATE AND ACETAMINOPHEN 5; 325 MG/1; MG/1
1 TABLET ORAL EVERY 8 HOURS PRN
Qty: 21 TABLET | Refills: 0 | Status: SHIPPED | OUTPATIENT
Start: 2024-09-03 | End: 2024-09-10

## 2024-09-03 NOTE — TELEPHONE ENCOUNTER
----- Message from Joan Nolasco sent at 9/3/2024  3:07 PM CDT -----  Patient could not do her urine today , she was checked in not able to supply, can you all please put another urine order in so she can drop off when she's able to thank you

## 2024-09-04 NOTE — PROGRESS NOTES
Assessment:       1. Shortness of breath    2. Type 2 diabetes mellitus with hyperglycemia, without long-term current use of insulin    3. Essential hypertension    4. Pain of left lower extremity    5. Chronic left-sided low back pain with left-sided sciatica    6. Dysuria    7. Class 3 severe obesity due to excess calories with serious comorbidity and body mass index (BMI) of 40.0 to 44.9 in adult        Assessment & Plan  Shortness of breath:  Worsening  -     CTA Chest Non-Coronary (PE Studies); Future; Expected date: 09/03/2024  -     D-DIMER, QUANTITATIVE; Future; Expected date: 09/03/2024  -     BNP; Future; Expected date: 09/03/2024  -     CBC Auto Differential; Future; Expected date: 09/03/2024  -     C-Reactive Protein; Future; Expected date: 09/03/2024  -     Basic Metabolic Panel; Future; Expected date: 09/03/2024    Type 2 diabetes mellitus with hyperglycemia, without long-term current use of insulin:  Uncontrolled  -     Basic Metabolic Panel; Future; Expected date: 09/03/2024  -     Diabetes Digital Medicine (DDMP) Enrollment Order    Essential hypertension: Stable  -     Hypertension Digital Medicine (HDMP) Enrollment Order    Pain of left lower extremity: Worsening  -     HYDROcodone-acetaminophen (NORCO) 5-325 mg per tablet; Take 1 tablet by mouth every 8 (eight) hours as needed.  Dispense: 21 tablet; Refill: 0    Chronic left-sided low back pain with left-sided sciatica: Worsening  -     HYDROcodone-acetaminophen (NORCO) 5-325 mg per tablet; Take 1 tablet by mouth every 8 (eight) hours as needed.  Dispense: 21 tablet; Refill: 0    Dysuria: New problem workup needed  -     Urine culture; Future; Expected date: 09/03/2024  -     Urinalysis; Future; Expected date: 09/03/2024    Class 3 severe obesity due to excess calories with serious comorbidity and body mass index (BMI) of 40.0 to 44.9 in adult: Worsening       Given her symptoms of shortness of breath and an elevated D-dimer level, a CT scan of  the chest with contrast is warranted to rule out pulmonary embolism. The CT scan will be scheduled, but if her symptoms worsen before the scheduled scan, she is advised to seek immediate medical attention at the nearest emergency room.     Her blood glucose levels are currently within the prediabetic range. She is advised to maintain a healthy diet, increase physical activity, and hydrate adequately. She has not started her prescribed Ozempic yet. She is instructed to start taking Ozempic as soon as she gets home. She is also encouraged to monitor her blood glucose levels regularly using the machine provided. Avoiding sugar, especially ice cream, is strongly recommended to help control her blood sugar levels.    She reports ongoing burning symptoms indicative of a urinary tract infection. A urine test will be conducted to confirm the presence of an infection. She is advised to drink plenty of water to help alleviate symptoms.    A prescription for hydrocodone will be provided to manage her lower back pain. She is advised to take the medication as needed, but no refills will be given. She reports that Flexeril makes her too sleepy, so hydrocodone is chosen as an alternative.  [place] prescription monitoring program was checked and okay.    She is advised to continue using Epsom salt for foot care. However, oral antifungal treatment like Lamisil cannot be prescribed until her other health issues are better controlled. A referral to a dermatologist will be made for further management of her nail fungus and a lesion on the left lower extremity, the patient will contact with the name of the dermatologist that the insurance accepts we can send her soon as possible.    A colonoscopy is due for colon cancer screening. She has not yet scheduled it despite being called. She is advised to follow up and schedule the colonoscopy as soon as possible.               The patient's BMI has been recorded in the chart. The patient  has been provided educational materials regarding the benefits of attaining and maintaining a normal weight. We will continue to address and follow this issue during follow up visits.   Patient agreed with assessment and plan. Patient verbalized understanding.     Subjective:       Patient ID: Lori Herrera is a 59 y.o. female.    Chief Complaint: Follow-up on chronic medical conditions.    HPI  History of Present Illness  The patient presents for evaluation of multiple medical concerns.    She is experiencing significant breathing difficulties, which she believes may be related to her lungs. Her breathing worsens when she becomes emotional or cries. She reports frequent wheezing and uses an inhaler for relief. She has not undergone a CT scan of the chest. She does not smoke.    She also reports a sensation of being underwater in one ear and has been feeling off balance. There is an unusual sensation in her eyes, but she has never experienced vertigo before. She has been sleeping excessively due to her overall discomfort. She is not experiencing any chest pain.    She continues to experience burning sensations due to her urinary tract infection (UTI). She has been consuming large amounts of water and Body Armor drinks. Her urine was clear in the morning but turned yellow and green as the day progressed.    She has not yet started taking her diabetes medication, Ozempic, as she was unsure whether to take it before or after meals. She has been making efforts to reduce her sugar intake and increase her water consumption. She has not been checking her blood sugar levels.    She has been taking Flexeril for pain management but is concerned about potential overuse. She has not taken any Advil today. She has previously used hydrocodone and oxycodone for pain relief. She experiences lower back pain, which hinders her ability to wash her feet or cut her toenails. This issue has been ongoing for some time.    She has been  unable to trim her toenails due to a fungal infection and has been using Epsom salt for foot care.    She has not been feeling well, so she has not gone for her colonoscopy. She is worried about her dog bite and has been shaking a lot.    FAMILY HISTORY  Her father had Parkinson's disease and  of it.       Past medical history, past social history was reviewed and discussed with the patient.    Review of Systems    Objective:      Physical Exam    Physical Exam  Ears show no signs of infection.  Lungs are clear to auscultation, heart is regular rate and rhythm, extremity has presence of no edema, has a lesion on the left lower extremity, has presence of scaling on bilateral feet.  Has thickening of the toenails.    Vital Signs  Blood pressure measures 128/74. Oxygen saturation is at 96 percent.     Results  Laboratory Studies  Rheumatoid factor was negative. CRP was abnormal. Blood sugar was 153. Protein levels were low. White count was normal. D-dimer was elevated.    Imaging  Ultrasound of the veins in the lower extremity was negative.     This note was generated with the assistance of ambient listening technology. Verbal consent was obtained by the patient and accompanying visitor(s) for the recording of patient appointment to facilitate this note. I attest to having reviewed and edited the generated note for accuracy, though some syntax or spelling errors may persist. Please contact the author of this note for any clarification.

## 2024-09-10 ENCOUNTER — PATIENT MESSAGE (OUTPATIENT)
Dept: ADMINISTRATIVE | Facility: HOSPITAL | Age: 59
End: 2024-09-10
Payer: MEDICAID

## 2024-09-16 ENCOUNTER — HOSPITAL ENCOUNTER (OUTPATIENT)
Dept: RADIOLOGY | Facility: HOSPITAL | Age: 59
Discharge: HOME | End: 2024-09-16
Attending: FAMILY MEDICINE
Payer: MEDICAID

## 2024-09-16 DIAGNOSIS — R06.02 SHORTNESS OF BREATH: ICD-10-CM

## 2024-09-16 PROCEDURE — 71275 CT ANGIOGRAPHY CHEST: CPT | Mod: TC,PO

## 2024-09-16 PROCEDURE — 71275 CT ANGIOGRAPHY CHEST: CPT | Mod: 26,,, | Performed by: STUDENT IN AN ORGANIZED HEALTH CARE EDUCATION/TRAINING PROGRAM

## 2024-09-16 PROCEDURE — 25500020 PHARM REV CODE 255: Mod: PO | Performed by: FAMILY MEDICINE

## 2024-09-16 RX ADMIN — IOHEXOL 100 ML: 350 INJECTION, SOLUTION INTRAVENOUS at 04:09

## 2024-09-17 DIAGNOSIS — N39.0 BACTERIAL UTI: Primary | ICD-10-CM

## 2024-09-17 DIAGNOSIS — R16.1 SPLENOMEGALY: Primary | ICD-10-CM

## 2024-09-17 DIAGNOSIS — A49.9 BACTERIAL UTI: Primary | ICD-10-CM

## 2024-09-17 RX ORDER — CIPROFLOXACIN 500 MG/1
500 TABLET ORAL 2 TIMES DAILY
Qty: 14 TABLET | Refills: 0 | Status: SHIPPED | OUTPATIENT
Start: 2024-09-17 | End: 2024-09-24

## 2024-09-25 ENCOUNTER — TELEPHONE (OUTPATIENT)
Dept: GASTROENTEROLOGY | Facility: CLINIC | Age: 59
End: 2024-09-25
Payer: MEDICAID

## 2024-09-25 NOTE — TELEPHONE ENCOUNTER
[facility] message sent in regards to scheduling colonoscopy. Similar message and phone was sent and read on 7/29/2024. Letter placed in the mail. Case request canceled for now. We will replace the order if patient decides to schedule.

## 2024-09-27 DIAGNOSIS — E11.65 TYPE 2 DIABETES MELLITUS WITH HYPERGLYCEMIA, WITHOUT LONG-TERM CURRENT USE OF INSULIN: Primary | ICD-10-CM

## 2024-09-27 NOTE — TELEPHONE ENCOUNTER
Refill Routing Note   Medication(s) are not appropriate for processing by [facility] for the following reason(s):        New or recently adjusted medication    [facility] action(s):  Defer               Appointments  past 12m or future 3m with PCP    Date Provider   Last Visit   9/3/2024 Carie Servin MD   Next Visit   10/24/2024 Carie Servin MD   ED visits in past 90 days: 0        Note composed:1:59 PM 09/27/2024

## 2024-09-27 NOTE — TELEPHONE ENCOUNTER
No care due was identified.  [facility] Embedded Care Due Messages. Reference number: [id number].   9/27/2024 12:32:57 AM CDT

## 2024-09-30 RX ORDER — SEMAGLUTIDE 0.68 MG/ML
0.5 INJECTION, SOLUTION SUBCUTANEOUS
Qty: 3 EACH | Refills: 1 | Status: SHIPPED | OUTPATIENT
Start: 2024-09-30

## 2024-10-01 RX ORDER — MONTELUKAST SODIUM 10 MG/1
10 TABLET ORAL NIGHTLY
Qty: 90 TABLET | Refills: 1 | Status: SHIPPED | OUTPATIENT
Start: 2024-10-01

## 2024-10-09 DIAGNOSIS — I10 ESSENTIAL HYPERTENSION: ICD-10-CM

## 2024-10-09 RX ORDER — AMLODIPINE BESYLATE 5 MG/1
5 TABLET ORAL
Qty: 90 TABLET | Refills: 1 | Status: SHIPPED | OUTPATIENT
Start: 2024-10-09

## 2024-10-09 RX ORDER — BENAZEPRIL HYDROCHLORIDE 10 MG/1
10 TABLET ORAL
Qty: 90 TABLET | Refills: 1 | Status: SHIPPED | OUTPATIENT
Start: 2024-10-09

## 2024-10-09 NOTE — TELEPHONE ENCOUNTER
No care due was identified.  [facility] Embedded Care Due Messages. Reference number: [id number].   10/09/2024 1:24:43 AM CDT

## 2024-11-22 ENCOUNTER — PATIENT MESSAGE (OUTPATIENT)
Dept: FAMILY MEDICINE | Facility: CLINIC | Age: 59
End: 2024-11-22
Payer: MEDICAID

## 2024-11-22 DIAGNOSIS — B96.89 ACUTE BRONCHITIS, BACTERIAL: ICD-10-CM

## 2024-11-22 DIAGNOSIS — J30.89 NON-SEASONAL ALLERGIC RHINITIS DUE TO OTHER ALLERGIC TRIGGER: ICD-10-CM

## 2024-11-22 DIAGNOSIS — J20.8 ACUTE BRONCHITIS, BACTERIAL: ICD-10-CM

## 2024-11-22 RX ORDER — ALBUTEROL SULFATE 90 UG/1
4 INHALANT RESPIRATORY (INHALATION) EVERY 6 HOURS PRN
Qty: 8.5 G | Refills: 1 | Status: SHIPPED | OUTPATIENT
Start: 2024-11-22

## 2024-11-22 NOTE — TELEPHONE ENCOUNTER
No care due was identified.  Health [facility] Embedded Care Due Messages. Reference number: [id number].   11/22/2024 9:39:09 AM CST

## 2024-11-27 DIAGNOSIS — E11.65 TYPE 2 DIABETES MELLITUS WITH HYPERGLYCEMIA, WITHOUT LONG-TERM CURRENT USE OF INSULIN: ICD-10-CM

## 2024-11-27 RX ORDER — SEMAGLUTIDE 0.68 MG/ML
0.5 INJECTION, SOLUTION SUBCUTANEOUS
Qty: 3 EACH | Refills: 0 | Status: SHIPPED | OUTPATIENT
Start: 2024-11-27

## 2024-11-27 NOTE — TELEPHONE ENCOUNTER
No care due was identified.  Health [facility] Embedded Care Due Messages. Reference number: [id number].   11/27/2024 12:30:40 AM CST  
No

## 2024-12-05 ENCOUNTER — HOSPITAL ENCOUNTER (OUTPATIENT)
Dept: RADIOLOGY | Facility: HOSPITAL | Age: 59
Discharge: HOME | End: 2024-12-05
Attending: FAMILY MEDICINE
Payer: MEDICAID

## 2024-12-05 ENCOUNTER — TELEPHONE (OUTPATIENT)
Dept: FAMILY MEDICINE | Facility: CLINIC | Age: 59
End: 2024-12-05
Payer: MEDICAID

## 2024-12-05 ENCOUNTER — OFFICE VISIT (OUTPATIENT)
Dept: FAMILY MEDICINE | Facility: CLINIC | Age: 59
End: 2024-12-05
Payer: MEDICAID

## 2024-12-05 ENCOUNTER — PATIENT MESSAGE (OUTPATIENT)
Dept: FAMILY MEDICINE | Facility: CLINIC | Age: 59
End: 2024-12-05
Payer: MEDICAID

## 2024-12-05 VITALS
OXYGEN SATURATION: 96 % | BODY MASS INDEX: 40.68 KG/M2 | DIASTOLIC BLOOD PRESSURE: 78 MMHG | SYSTOLIC BLOOD PRESSURE: 132 MMHG | WEIGHT: 237 LBS

## 2024-12-05 DIAGNOSIS — L98.9 SKIN LESION: ICD-10-CM

## 2024-12-05 DIAGNOSIS — R16.0 HEPATOMEGALY: Primary | ICD-10-CM

## 2024-12-05 DIAGNOSIS — M54.2 NECK PAIN: ICD-10-CM

## 2024-12-05 DIAGNOSIS — G62.9 NEUROPATHY: ICD-10-CM

## 2024-12-05 DIAGNOSIS — E11.65 TYPE 2 DIABETES MELLITUS WITH HYPERGLYCEMIA, WITHOUT LONG-TERM CURRENT USE OF INSULIN: Primary | ICD-10-CM

## 2024-12-05 DIAGNOSIS — M54.42 CHRONIC LEFT-SIDED LOW BACK PAIN WITH LEFT-SIDED SCIATICA: ICD-10-CM

## 2024-12-05 DIAGNOSIS — R16.1 SPLENOMEGALY: ICD-10-CM

## 2024-12-05 DIAGNOSIS — R30.0 DYSURIA: ICD-10-CM

## 2024-12-05 DIAGNOSIS — E11.65 TYPE 2 DIABETES MELLITUS WITH HYPERGLYCEMIA, WITHOUT LONG-TERM CURRENT USE OF INSULIN: ICD-10-CM

## 2024-12-05 DIAGNOSIS — G89.29 CHRONIC LEFT-SIDED LOW BACK PAIN WITH LEFT-SIDED SCIATICA: ICD-10-CM

## 2024-12-05 DIAGNOSIS — R74.8 INCREASED LIVER ENZYMES: ICD-10-CM

## 2024-12-05 PROCEDURE — 3075F SYST BP GE 130 - 139MM HG: CPT | Mod: CPTII,,, | Performed by: FAMILY MEDICINE

## 2024-12-05 PROCEDURE — 3061F NEG MICROALBUMINURIA REV: CPT | Mod: CPTII,,, | Performed by: FAMILY MEDICINE

## 2024-12-05 PROCEDURE — 3008F BODY MASS INDEX DOCD: CPT | Mod: CPTII,,, | Performed by: FAMILY MEDICINE

## 2024-12-05 PROCEDURE — 99214 OFFICE O/P EST MOD 30 MIN: CPT | Mod: S$PBB,,, | Performed by: FAMILY MEDICINE

## 2024-12-05 PROCEDURE — 99999 PR PBB SHADOW E&M-EST. PATIENT-LVL III: CPT | Mod: PBBFAC,,, | Performed by: FAMILY MEDICINE

## 2024-12-05 PROCEDURE — 3066F NEPHROPATHY DOC TX: CPT | Mod: CPTII,,, | Performed by: FAMILY MEDICINE

## 2024-12-05 PROCEDURE — 99213 OFFICE O/P EST LOW 20 MIN: CPT | Mod: PBBFAC,25,PO | Performed by: FAMILY MEDICINE

## 2024-12-05 PROCEDURE — 76700 US EXAM ABDOM COMPLETE: CPT | Mod: TC,PO

## 2024-12-05 PROCEDURE — 3078F DIAST BP <80 MM HG: CPT | Mod: CPTII,,, | Performed by: FAMILY MEDICINE

## 2024-12-05 PROCEDURE — 76700 US EXAM ABDOM COMPLETE: CPT | Mod: 26,,, | Performed by: RADIOLOGY

## 2024-12-05 PROCEDURE — 4010F ACE/ARB THERAPY RXD/TAKEN: CPT | Mod: CPTII,,, | Performed by: FAMILY MEDICINE

## 2024-12-05 PROCEDURE — 3044F HG A1C LEVEL LT 7.0%: CPT | Mod: CPTII,,, | Performed by: FAMILY MEDICINE

## 2024-12-05 RX ORDER — CYCLOBENZAPRINE HCL 10 MG
10 TABLET ORAL DAILY PRN
Qty: 90 TABLET | Refills: 0 | Status: SHIPPED | OUTPATIENT
Start: 2024-12-05

## 2024-12-05 RX ORDER — GABAPENTIN 300 MG/1
300 CAPSULE ORAL NIGHTLY
Qty: 90 CAPSULE | Refills: 0 | Status: SHIPPED | OUTPATIENT
Start: 2024-12-05 | End: 2025-03-05

## 2024-12-05 RX ORDER — SEMAGLUTIDE 0.68 MG/ML
0.5 INJECTION, SOLUTION SUBCUTANEOUS
Qty: 3 EACH | Refills: 2 | Status: SHIPPED | OUTPATIENT
Start: 2024-12-05

## 2024-12-05 NOTE — TELEPHONE ENCOUNTER
----- Message from Avril sent at 12/4/2024  5:43 PM CST -----  Regarding: Lab Work  Pt stated that she will be fasting for her US, if there are any fasting labs that she will need to have done prior to seeing Dr. Servin please put them in so she can have them done prior to her appt with Dr. Servin while she is already fasting.    Thanks.

## 2024-12-06 NOTE — TELEPHONE ENCOUNTER
Spoke with patient.  The urinalysis and culture was not collect by the lab due to whoever check pt in did not see additional labs bc it was a different appointment.  It was not linked to the existing labs.   Pt states that she will come back up here on Tuesday to re submit her urine.

## 2024-12-07 ENCOUNTER — PATIENT MESSAGE (OUTPATIENT)
Dept: FAMILY MEDICINE | Facility: CLINIC | Age: 59
End: 2024-12-07
Payer: MEDICAID

## 2024-12-07 ENCOUNTER — DOCUMENTATION ONLY (OUTPATIENT)
Dept: FAMILY MEDICINE | Facility: CLINIC | Age: 59
End: 2024-12-07
Payer: MEDICAID

## 2024-12-07 NOTE — PROGRESS NOTES
VAISHALI MOSES ([place]: [id number])    [employer] is reviewing your PA request. You may close this dialog, return to your dashboard, and perform other tasks. To check for an update later, refresh this page, or open this request again from your dashboard.  To follow up on this request after 24 hours, please contact [employer] directly at [phone].

## 2024-12-12 NOTE — PROGRESS NOTES
Assessment:       1. Hepatomegaly    2. Increased liver enzymes    3. Splenomegaly    4. Type 2 diabetes mellitus with hyperglycemia, without long-term current use of insulin    5. Chronic left-sided low back pain with left-sided sciatica    6. Neck pain    7. Neuropathy    8. Skin lesion    9. Dysuria        Assessment & Plan    Hepatomegaly:  Stable  -     Ambulatory referral/consult to Hepatology; Future; Expected date: 12/12/2024    Increased liver enzymes: Stable  -     Ambulatory referral/consult to Hepatology; Future; Expected date: 12/12/2024    Splenomegaly: Stable  -     Ambulatory referral/consult to Hepatology; Future; Expected date: 12/12/2024    Type 2 diabetes mellitus with hyperglycemia, without long-term current use of insulin: Uncontrolled  -     semaglutide (OZEMPIC) 0.25 mg or 0.5 mg (2 mg/3 mL) pen injector; Inject 0.5 mg into the skin every 7 days.  Dispense: 3 each; Refill: 2    Chronic left-sided low back pain with left-sided sciatica: Worsening  -     gabapentin (NEURONTIN) 300 MG capsule; Take 1 capsule (300 mg total) by mouth every evening.  Dispense: 90 capsule; Refill: 0  -     Ambulatory referral/consult to Pain Clinic; Future; Expected date: 12/12/2024    Neck pain: Worsening  -     cyclobenzaprine (FLEXERIL) 10 MG tablet; Take 1 tablet (10 mg total) by mouth daily as needed for Muscle spasms.  Dispense: 90 tablet; Refill: 0    Neuropathy: Worsening    Skin lesion: Worsening  -     Ambulatory referral/consult to Dermatology; Future; Expected date: 12/12/2024    Dysuria: New problem workup needed  -     Urinalysis; Future; Expected date: 12/05/2024  -     Urine culture; Future; Expected date: 12/05/2024       Liver ultrasound shows mildly enlarged liver (17 cm) with worsening echotexture, likely due to fatty liver disease  Considered alcohol intake history vs. obesity as potential causes of liver changes  Noted spleen enlargement, common in fatty liver disease and advanced liver  disease  Assessed for potential fibrosis or cirrhosis; FibroScan needed for definitive evaluation  Evaluated neuropathic symptoms in foot, potentially related to diabetes or lower back issues  Considered pain management options for chronic back pain    LIVER DISEASE:  - Discussed the concept of fibrosis and its impact on liver function.  - Noted the patient's enlarged spleen, which is common in fatty liver disease, cirrhosis, and advanced liver disease.  - Performed ultrasound showing enlarged liver (17 cm) with worsening echotexture and mildly nodular contour.  - No fibrosis or cirrhosis detected.  - Recommend consultation with a hepatologist and ordered a FibroScan to assess liver condition.  - Placed an order for a hepatologist referral.  - Educated the patient on the connection between alcohol consumption, obesity, and liver disease.  - Decreased Flexeril dosage to daily at bedtime due to liver concerns.  - Ordered liver function tests.  - Referred the patient to a hepatologist for further liver evaluation and potential FibroScan.  - Advised the patient to avoid sugar and lose weight to improve liver health.  - Discussed new medications for fatty liver disease, noting they don't help with fibrosis.  - Noted the patient's history of alcohol intake and obesity, which could contribute to fatty liver disease.  - Performed ultrasound showing enlarged liver (17 cm) with worsening echotexture, indicating fatty liver.  - Noted the patient's enlarged spleen, which is common in fatty liver disease and cirrhosis.  - Confirmed spleen enlargement through ultrasound.  - Recommend consultation with a hepatologist for further evaluation of liver and spleen condition.    WEIGHT MANAGEMENT:  - Initiated Mounjaro (if covered by insurance) as an alternative to Ozempic for weight management with potentially fewer side effects.  - Continued Ozempic 0.5 mg if Mounjaro is not covered.  - Noted the patient's weight fluctuations, losing  20 lbs but regaining some weight.  - Advised the patient to avoid sugar and lose weight to improve overall health, including liver function.  - Recommend continuing weight loss efforts.    DIABETES:  - Explained neuropathy symptoms associated with diabetes.  - Noted the patient's report of pins and needles sensation in foot, particularly at night.  - Confirmed good circulation in foot despite neuropathy symptoms.    CHRONIC BACK PAIN:  - Referred the patient to a pain management specialist for chronic back pain.  - Noted the patient's report of ongoing lower back pain.  - Prescribed Flexeril (cyclobenzaprine) daily at bedtime for muscle relaxation, reduced from twice daily due to liver concerns.    NEUROPATHY:  - Noted the patient's report of severe pain in foot at night, feeling like a rubber band wrapping around toes and foot.  - Suggested the pain could be due to neuropathy from diabetes or lower back issues.  - Noted the patient's report of difficulty walking and trusting her leg.  - Discussed the patient's inquiry about obtaining a handicap tag due to walking difficulties.    URINARY SYMPTOMS:  - Ordered urinalysis and urine culture if patient experiences burning sensation during urination.  - Instructed the patient to contact the office if urinary symptoms develop.  - Noted the   patient's report of mild burning sensation during urination.    OTHER INSTRUCTIONS:  - Patient to discontinue alcohol consumption.  - Patient to avoid all sugary drinks, including cranberry grape juice.  - Continued melatonin for sleep.            The patient's BMI has been recorded in the chart. The patient has been provided educational materials regarding the benefits of attaining and maintaining a normal weight. We will continue to address and follow this issue during follow up visits.   Patient agreed with assessment and plan. Patient verbalized understanding.     Subjective:       Patient ID: Lori Herrera is a 59 y.o.  female.    Chief Complaint:  Hospital follow-up      History of Present Illness    CHIEF COMPLAINT:  Patient presents for follow-up on multiple health concerns, including liver issues, back pain, and foot/leg discomfort.    HPI:  Patient reports ongoing liver concerns. Previous liver imaging showed an enlarged liver measuring 17 cm, described as mildly enlarged with worsening echotexture. Patient has a history of alcohol consumption in the past but has significantly reduced intake since then.    Patient complains of persistent neck and lower back pain. She recently received an injection in her back for pain management. The pain extends to her lower extremities, causing difficulty with movement and daily activities. She reports instability in her leg and difficulty dressing, particularly on the affected leg.    Patient describes foot and leg discomfort, particularly at night. She feels a sensation of constriction around her foot and tightness across her toes. This pain is severe enough to cause sudden awakening, mainly affecting one foot and sometimes the great toe.    Patient mentions a recent hospital visit on October 3rd due to severe pain and dizziness. She arrived by ambulance but left without being seen due to long wait times. She attributes this episode to a combination of medications she was taking at the time, including antibiotics, probiotics, muscle relaxers, and pain medication.    Patient reports recent weight fluctuations. She was previously down to 229 lbs but has since gained some weight back. Her current weight is 236 lbs, down from a previous high of 255 lbs.    Patient is currently taking Ozempic for weight management, reporting multiple side effects. She has been having GI issues, which she initially attributed to the medication but now believes may have been due to a recent meal of homemade hamburger that caused stomach issues for both her and her .    Patient denies current urinary  burning or significant urinary tract symptoms.    MEDICATIONS:  Patient is on Ozempic 0.5 mg weekly injection for weight loss. She is also taking Flexeril (cyclobenzaprine) twice daily for muscle pain and back pain. Melatonin is used for sleep. Gabapentin 300 mg is taken as needed for nerve pain.    MEDICAL HISTORY:  Patient has a history of fatty liver disease, enlarged spleen, and diabetes.    SURGICAL HISTORY:  Patient has undergone gallbladder removal.    IMAGING:  A recent abdominal ultrasound revealed an enlarged liver measuring 17 cm with mildly worsening echotexture and a mildly nodular contour. No fibrosis or cirrhosis was noted. The common bile duct and kidneys appeared normal. The spleen was found to be enlarged. Patient also had a chest XR on October 3rd.    SOCIAL HISTORY:  Patient has a history of heavy alcohol consumption years ago. Currently, she may have an occasional drink.      ROS:  ROS as indicated in HPI.          Past medical history, past social history was reviewed and discussed with the patient.        Objective:      Physical Exam      General: No acute distress. Well-developed. Well-nourished.  Eyes: EOMI. Sclerae anicteric.  HENT: Normocephalic. Atraumatic. Nares patent. Moist oral mucosa.  Cardiovascular: Regular rate. Regular rhythm.   Respiratory: Normal respiratory effort. Clear to auscultation bilaterally. No rales. No rhonchi. No wheezing.  Musculoskeletal: No  obvious deformity.  Extremities: No lower extremity edema.  Neurological: Alert & oriented x3. No slurred speech. Normal gait.  Psychiatric: Normal mood. Normal affect. Good insight. Good judgment.  Skin: Warm. Dry. No rash.                   This note was generated with the assistance of ambient listening technology. Verbal consent was obtained by the patient and accompanying visitor(s) for the recording of patient appointment to facilitate this note. I attest to having reviewed and edited the generated note for accuracy,  though some syntax or spelling errors may persist. Please contact the author of this note for any clarification.

## 2024-12-15 ENCOUNTER — DOCUMENTATION ONLY (OUTPATIENT)
Dept: FAMILY MEDICINE | Facility: CLINIC | Age: 59
End: 2024-12-15
Payer: MEDICAID

## 2024-12-31 DIAGNOSIS — E11.65 TYPE 2 DIABETES MELLITUS WITH HYPERGLYCEMIA, WITHOUT LONG-TERM CURRENT USE OF INSULIN: ICD-10-CM

## 2024-12-31 RX ORDER — SEMAGLUTIDE 0.68 MG/ML
0.5 INJECTION, SOLUTION SUBCUTANEOUS
Qty: 9 ML | Refills: 1 | Status: SHIPPED | OUTPATIENT
Start: 2024-12-31

## 2024-12-31 NOTE — TELEPHONE ENCOUNTER
No care due was identified.  Health [facility] Embedded Care Due Messages. Reference number: [id number].   12/31/2024 12:34:10 AM CST

## 2024-12-31 NOTE — TELEPHONE ENCOUNTER
Refill Decision Note   Lori Javier  is requesting a refill authorization.    Brief Assessment and Rationale for Refill:   Approve       Medication Therapy Plan:         Comments:     Note composed:1:23 PM 12/31/2024

## 2025-01-03 ENCOUNTER — PATIENT MESSAGE (OUTPATIENT)
Dept: FAMILY MEDICINE | Facility: CLINIC | Age: 60
End: 2025-01-03
Payer: MEDICAID

## 2025-01-18 DIAGNOSIS — J20.8 ACUTE BRONCHITIS, BACTERIAL: ICD-10-CM

## 2025-01-18 DIAGNOSIS — B96.89 ACUTE BRONCHITIS, BACTERIAL: ICD-10-CM

## 2025-01-18 DIAGNOSIS — J30.89 NON-SEASONAL ALLERGIC RHINITIS DUE TO OTHER ALLERGIC TRIGGER: ICD-10-CM

## 2025-01-18 RX ORDER — ALBUTEROL SULFATE 90 UG/1
4 INHALANT RESPIRATORY (INHALATION) EVERY 6 HOURS PRN
Qty: 54 G | Refills: 3 | Status: SHIPPED | OUTPATIENT
Start: 2025-01-18

## 2025-01-18 NOTE — TELEPHONE ENCOUNTER
No care due was identified.  Health [facility] Embedded Care Due Messages. Reference number: [id number].   1/18/2025 12:20:17 AM CST

## 2025-01-19 ENCOUNTER — PATIENT MESSAGE (OUTPATIENT)
Dept: FAMILY MEDICINE | Facility: CLINIC | Age: 60
End: 2025-01-19
Payer: MEDICAID

## 2025-01-19 DIAGNOSIS — M54.2 NECK PAIN: ICD-10-CM

## 2025-01-19 DIAGNOSIS — M54.42 CHRONIC LEFT-SIDED LOW BACK PAIN WITH LEFT-SIDED SCIATICA: ICD-10-CM

## 2025-01-19 DIAGNOSIS — G89.29 CHRONIC LEFT-SIDED LOW BACK PAIN WITH LEFT-SIDED SCIATICA: ICD-10-CM

## 2025-01-19 RX ORDER — GABAPENTIN 300 MG/1
300 CAPSULE ORAL NIGHTLY
Qty: 90 CAPSULE | Refills: 0 | OUTPATIENT
Start: 2025-01-19 | End: 2025-04-19

## 2025-01-19 RX ORDER — CYCLOBENZAPRINE HCL 10 MG
10 TABLET ORAL DAILY PRN
Qty: 90 TABLET | Refills: 0 | OUTPATIENT
Start: 2025-01-19

## 2025-01-19 NOTE — TELEPHONE ENCOUNTER
No care due was identified.  Health [facility] Embedded Care Due Messages. Reference number: [id number].   1/19/2025 10:21:57 AM CST

## 2025-01-19 NOTE — TELEPHONE ENCOUNTER
Refill Decision Note   Lori Javier  is requesting a refill authorization.    Brief Assessment and Rationale for Refill:   Approve       Medication Therapy Plan:         Comments:     Note composed:11:18 PM 01/18/2025

## 2025-02-21 ENCOUNTER — TELEPHONE (OUTPATIENT)
Dept: FAMILY MEDICINE | Facility: CLINIC | Age: 60
End: 2025-02-21
Payer: MEDICAID

## 2025-03-13 ENCOUNTER — TELEPHONE (OUTPATIENT)
Dept: FAMILY MEDICINE | Facility: CLINIC | Age: 60
End: 2025-03-13

## 2025-03-13 NOTE — TELEPHONE ENCOUNTER
----- Message from [place] sent at 3/13/2025  3:42 PM CDT -----  Type: Needs Medical AdviceWho Called:  Patient Symptoms (please be specific):  How long has patient had these symptoms:  Pharmacy name and phone #:  Best Call Back Number: [phone] Information: Patient is requesting a call back from the nurse ASAP. Patient think her meds. are causing some symptoms of numdness in hands.

## 2025-03-13 NOTE — TELEPHONE ENCOUNTER
Spoke to pt, pt states she has been having numbness in her hands for a week. Pt denies any other symptoms. Pt would like to see pcp as soon as possible because she would like to discuss hematology appointment and previous dog bite bothering her as well. Scheduled pt for 3/18 and placed on the wait list. Pt would like to be seen sooner only by Dr. Servin.

## 2025-03-15 DIAGNOSIS — M54.2 NECK PAIN: ICD-10-CM

## 2025-03-15 NOTE — TELEPHONE ENCOUNTER
No care due was identified.  [facility] Embedded Care Due Messages. Reference number: [id number].   3/15/2025 3:00:47 PM CDT

## 2025-03-16 RX ORDER — CYCLOBENZAPRINE HCL 10 MG
10 TABLET ORAL NIGHTLY
Qty: 90 TABLET | Refills: 0 | Status: SHIPPED | OUTPATIENT
Start: 2025-03-16

## 2025-03-20 DIAGNOSIS — I10 ESSENTIAL HYPERTENSION: ICD-10-CM

## 2025-03-20 RX ORDER — AMLODIPINE BESYLATE 5 MG/1
5 TABLET ORAL
Qty: 90 TABLET | Refills: 2 | Status: SHIPPED | OUTPATIENT
Start: 2025-03-20

## 2025-03-20 NOTE — TELEPHONE ENCOUNTER
Refill Decision Note   Lori Javier  is requesting a refill authorization.  Brief Assessment and Rationale for Refill:  Approve     Medication Therapy Plan:        Comments:     Note composed:4:07 PM 03/20/2025

## 2025-03-20 NOTE — TELEPHONE ENCOUNTER
No care due was identified.  [facility] Embedded Care Due Messages. Reference number: [id number].   3/20/2025 2:01:00 PM CDT

## 2025-04-01 ENCOUNTER — NURSE TRIAGE (OUTPATIENT)
Dept: ADMINISTRATIVE | Facility: CLINIC | Age: 60
End: 2025-04-01
Payer: MEDICAID

## 2025-04-01 NOTE — TELEPHONE ENCOUNTER
Pt is crying and having trouble breathing on the call. Pt states that she is just really upset b/c her apnt got cancelled and she needed this apnt. Pt's left leg started having pins and needles. Now in the right hand and arm too.While on the phone pt is struggling to breath, pt had to stop and give herself a breathing tx. Pt states that anything she picks up, she drops. Most of the symptoms are on  the left side. Pt also states that she has Diabetes but she does not monitor it. When asked what her latest Glucose was, pt states she had no idea. Pt states that her hands and balance are her main concern. Every time pt stands she states she can hear left leg make a snapping sound. Pt cannot hold a pen, or even a tissue. She states that she needs help bathing. Dispo- Call  now. Pt states she will not call 911 b/c last time she went by ambulance to the ER she just sat out in the rowe for 3 hours. I urged the importance of getting EMS out to her, Pt [place]. Advised to call back with any further concerns.         Reason for Disposition   New neurologic deficit that is present now: * Weakness of the face, arm, or leg on one side of the body * Numbness of the face, arm, or leg on one side of the body * Loss of speech or garbled speech    Additional Information   Negative: SEVERE difficulty breathing (e.g., struggling for each breath, speaks in single words)   Negative: Shock suspected (e.g., cold/pale/clammy skin, too weak to stand, low BP, rapid pulse)   Negative: Difficult to awaken or acting confused (e.g., disoriented, slurred speech)   Negative: Fainted, and still feels dizzy afterwards   Negative: Overdose (accidental or intentional) of medications    Protocols used: Dizziness-A-OH

## 2025-04-01 NOTE — TELEPHONE ENCOUNTER
"Advised pt., per Dr. Servin, to be taken to ED for evaluation of reported fatigue, numbness/tingling of bilateral hands x 2 weeks and feeling of "heaviness" in bilateral arms that started today.  Pt. Declined to be taken to ED but states she will have her  take her to urgent care either tonight or in AM.  She also confirmed appt. With Dr. Servin on 4/3/25.  "

## 2025-04-02 ENCOUNTER — TELEPHONE (OUTPATIENT)
Dept: FAMILY MEDICINE | Facility: CLINIC | Age: 60
End: 2025-04-02
Payer: MEDICAID

## 2025-04-02 NOTE — TELEPHONE ENCOUNTER
----- Message from [employer] sent at 4/1/2025  4:47 PM CDT -----  Type:  Needs Medical AdviceWho Called: the patientSymptoms (please be specific):Symptoms: Breathing Trouble, Numbness, Arm Pain - Not From InjuryOutcome: Talk to a nurse or provider within 15 minutes.Reason: Caller denied all higher acuity questionsThe caller accepted this outcome.How long has patient had these symptoms:  Pharmacy name and phone #:  Would the patient rather a call back or a response via [facility]? call back/Best Call Back Number: [phone] Additional Information: transferring pt to dora De La Rosa

## 2025-04-03 ENCOUNTER — OFFICE VISIT (OUTPATIENT)
Dept: FAMILY MEDICINE | Facility: CLINIC | Age: 60
End: 2025-04-03
Payer: MEDICAID

## 2025-04-03 ENCOUNTER — HOSPITAL ENCOUNTER (OUTPATIENT)
Dept: RADIOLOGY | Facility: HOSPITAL | Age: 60
Discharge: HOME | End: 2025-04-03
Attending: FAMILY MEDICINE
Payer: MEDICAID

## 2025-04-03 VITALS
WEIGHT: 228.81 LBS | BODY MASS INDEX: 39.28 KG/M2 | SYSTOLIC BLOOD PRESSURE: 128 MMHG | HEART RATE: 93 BPM | OXYGEN SATURATION: 98 % | DIASTOLIC BLOOD PRESSURE: 74 MMHG

## 2025-04-03 DIAGNOSIS — R20.0 NUMBNESS: ICD-10-CM

## 2025-04-03 DIAGNOSIS — F32.1 CURRENT MODERATE EPISODE OF MAJOR DEPRESSIVE DISORDER WITHOUT PRIOR EPISODE: ICD-10-CM

## 2025-04-03 DIAGNOSIS — I10 ESSENTIAL HYPERTENSION: ICD-10-CM

## 2025-04-03 DIAGNOSIS — G89.29 CHRONIC PAIN OF LEFT KNEE: ICD-10-CM

## 2025-04-03 DIAGNOSIS — Z12.11 SCREENING FOR COLON CANCER: ICD-10-CM

## 2025-04-03 DIAGNOSIS — R29.898 MUSCULAR DECONDITIONING: ICD-10-CM

## 2025-04-03 DIAGNOSIS — M25.562 CHRONIC PAIN OF LEFT KNEE: ICD-10-CM

## 2025-04-03 DIAGNOSIS — E66.01 CLASS 2 SEVERE OBESITY DUE TO EXCESS CALORIES WITH SERIOUS COMORBIDITY AND BODY MASS INDEX (BMI) OF 39.0 TO 39.9 IN ADULT: ICD-10-CM

## 2025-04-03 DIAGNOSIS — L98.9 SKIN LESION: ICD-10-CM

## 2025-04-03 DIAGNOSIS — G89.29 CHRONIC BILATERAL LOW BACK PAIN WITHOUT SCIATICA: ICD-10-CM

## 2025-04-03 DIAGNOSIS — E78.49 OTHER HYPERLIPIDEMIA: ICD-10-CM

## 2025-04-03 DIAGNOSIS — M54.50 CHRONIC BILATERAL LOW BACK PAIN WITHOUT SCIATICA: ICD-10-CM

## 2025-04-03 DIAGNOSIS — E66.812 CLASS 2 SEVERE OBESITY DUE TO EXCESS CALORIES WITH SERIOUS COMORBIDITY AND BODY MASS INDEX (BMI) OF 39.0 TO 39.9 IN ADULT: ICD-10-CM

## 2025-04-03 DIAGNOSIS — E11.9 DIABETES MELLITUS WITH COINCIDENT HYPERTENSION: Primary | ICD-10-CM

## 2025-04-03 DIAGNOSIS — I10 DIABETES MELLITUS WITH COINCIDENT HYPERTENSION: Primary | ICD-10-CM

## 2025-04-03 PROCEDURE — 3044F HG A1C LEVEL LT 7.0%: CPT | Mod: CPTII,,, | Performed by: FAMILY MEDICINE

## 2025-04-03 PROCEDURE — 73562 X-RAY EXAM OF KNEE 3: CPT | Mod: 26,LT,, | Performed by: RADIOLOGY

## 2025-04-03 PROCEDURE — 3078F DIAST BP <80 MM HG: CPT | Mod: CPTII,,, | Performed by: FAMILY MEDICINE

## 2025-04-03 PROCEDURE — 99999 PR PBB SHADOW E&M-EST. PATIENT-LVL V: CPT | Mod: PBBFAC,,, | Performed by: FAMILY MEDICINE

## 2025-04-03 PROCEDURE — 1159F MED LIST DOCD IN RCRD: CPT | Mod: CPTII,,, | Performed by: FAMILY MEDICINE

## 2025-04-03 PROCEDURE — 4010F ACE/ARB THERAPY RXD/TAKEN: CPT | Mod: CPTII,,, | Performed by: FAMILY MEDICINE

## 2025-04-03 PROCEDURE — 99214 OFFICE O/P EST MOD 30 MIN: CPT | Mod: S$PBB,,, | Performed by: FAMILY MEDICINE

## 2025-04-03 PROCEDURE — 3008F BODY MASS INDEX DOCD: CPT | Mod: CPTII,,, | Performed by: FAMILY MEDICINE

## 2025-04-03 PROCEDURE — 3074F SYST BP LT 130 MM HG: CPT | Mod: CPTII,,, | Performed by: FAMILY MEDICINE

## 2025-04-03 PROCEDURE — G2211 COMPLEX E/M VISIT ADD ON: HCPCS | Mod: S$PBB,,, | Performed by: FAMILY MEDICINE

## 2025-04-03 PROCEDURE — 73562 X-RAY EXAM OF KNEE 3: CPT | Mod: TC,FY,PO,LT

## 2025-04-03 PROCEDURE — 99215 OFFICE O/P EST HI 40 MIN: CPT | Mod: PBBFAC,25,PO | Performed by: FAMILY MEDICINE

## 2025-04-03 RX ORDER — BUPROPION HYDROCHLORIDE 150 MG/1
150 TABLET ORAL DAILY
Qty: 30 TABLET | Refills: 11 | Status: SHIPPED | OUTPATIENT
Start: 2025-04-03 | End: 2026-04-03

## 2025-04-06 ENCOUNTER — RESULTS FOLLOW-UP (OUTPATIENT)
Dept: FAMILY MEDICINE | Facility: CLINIC | Age: 60
End: 2025-04-06

## 2025-04-06 DIAGNOSIS — G89.29 CHRONIC PAIN OF LEFT KNEE: ICD-10-CM

## 2025-04-06 DIAGNOSIS — M25.562 CHRONIC PAIN OF LEFT KNEE: ICD-10-CM

## 2025-04-06 DIAGNOSIS — E55.9 VITAMIN D DEFICIENCY: Primary | ICD-10-CM

## 2025-04-06 DIAGNOSIS — E78.49 OTHER HYPERLIPIDEMIA: ICD-10-CM

## 2025-04-06 RX ORDER — UBIDECARENONE 100 MG
200 CAPSULE ORAL DAILY
COMMUNITY
Start: 2025-04-06 | End: 2026-04-06

## 2025-04-06 RX ORDER — PRAVASTATIN SODIUM 10 MG/1
10 TABLET ORAL DAILY
Qty: 90 TABLET | Refills: 3 | Status: SHIPPED | OUTPATIENT
Start: 2025-04-06

## 2025-04-06 RX ORDER — ERGOCALCIFEROL 1.25 MG/1
50000 CAPSULE ORAL
Qty: 12 CAPSULE | Refills: 1 | Status: SHIPPED | OUTPATIENT
Start: 2025-04-06

## 2025-04-06 NOTE — PROGRESS NOTES
Subjective:       Patient ID: Lori Herrera is a 59 y.o. female.    Chief Complaint: Follow-up (Pt reports pain/numbness in hands)    HPI    The patient is here today accompanied by her  multiple medical complains the patient has diabetes, the last hemoglobin A1c was therapeutic.  The patient stated that she has not able to check fingerstick blood glucose because she does not know how to use the machine.  The patient complains of numbness and tingling sensation in the hands.  She is complaining of worsening depression symptoms the patient  stated that the patient is most of the time in bed, she does not have too much energy, she is crying frequently.  He complains of pain on the left knee, he also complains of pain on the lower back without radiation to the lower extremities.  The patient stated that is difficult for her to lose weight, the last cholesterol levels were the slightly up for diabetes.    Past medical history, past social history was reviewed and discussed with the patient.    Review of Systems    Objective:      Physical Exam  Constitutional:       Appearance: Normal appearance. She is obese.   HENT:      Head: Normocephalic and atraumatic.   Cardiovascular:      Rate and Rhythm: Normal rate and regular rhythm.   Pulmonary:      Effort: Pulmonary effort is normal. No respiratory distress.      Breath sounds: No wheezing.   Abdominal:      General: Abdomen is flat. Bowel sounds are normal. There is no distension.      Tenderness: There is no abdominal tenderness.   Musculoskeletal:      Right lower leg: No edema.      Left lower leg: No edema.   Neurological:      Mental Status: She is alert.   Psychiatric:         Mood and Affect: Mood is anxious and depressed. Affect is tearful.         Assessment:       1. Diabetes mellitus with coincident hypertension    2. Essential hypertension    3. Chronic pain of left knee    4. Other hyperlipidemia    5. Class 2 severe obesity due to excess  calories with serious comorbidity and body mass index (BMI) of 39.0 to 39.9 in adult    6. Numbness    7. Screening for colon cancer    8. Current moderate episode of major depressive disorder without prior episode    9. Skin lesion    10. Muscular deconditioning    11. Chronic bilateral low back pain without sciatica        Plan:       Diabetes mellitus with coincident hypertension: Improved  -     Ambulatory referral/consult to Diabetes Education; Future; Expected date: 04/10/2025  -     Hemoglobin A1C; Future; Expected date: 04/03/2025  -     Ambulatory referral/consult to Podiatry; Future; Expected date: 04/10/2025    Essential hypertension: Stable  -     Hypertension Digital Medicine (HDMP) Enrollment Order    Chronic pain of left knee: Worsening  -     X-Ray Knee 3 View Left; Future; Expected date: 04/03/2025  -     CBC Without Differential; Future; Expected date: 04/03/2025    Other hyperlipidemia:  Stable  -     Lipid Panel; Future; Expected date: 04/03/2025  -     Comprehensive Metabolic Panel; Future; Expected date: 04/03/2025    Class 2 severe obesity due to excess calories with serious comorbidity and body mass index (BMI) of 39.0 to 39.9 in adult: Improved  -     Vitamin D; Future; Expected date: 04/03/2025    Numbness: Worsening  -     Folate; Future; Expected date: 04/03/2025  -     Vitamin B12; Future; Expected date: 04/03/2025  -     EMG W/ ULTRASOUND AND NERVE CONDUCTION TEST 2 Extremities; Future    Screening for colon cancer  -     Fecal Immunochemical Test (iFOBT); Future; Expected date: 04/03/2025    Current moderate episode of major depressive disorder without prior episode: Worsening  -     buPROPion (WELLBUTRIN XL) 150 MG TB24 tablet; Take 1 tablet (150 mg total) by mouth once daily.  Dispense: 30 tablet; Refill: 11    Skin lesion:  Worsening  -     Ambulatory referral/consult to Dermatology; Future; Expected date: 04/10/2025    Muscular deconditioning: Worsening  -     Ambulatory  Referral/Consult to Physical Therapy; Future; Expected date: 04/10/2025    Chronic bilateral low back pain without sciatica: worsening  -     Ambulatory Referral/Consult to Physical Therapy; Future; Expected date: 04/10/2025         Will start patient on Wellbutrin, refer the patient to physical therapist secondary to muscular deconditioning, will repeat labs, will order x-rays.  Will refer the patient to see the podiatrist and to diabetic education.  The patient's BMI has been recorded in the chart. The patient has been provided educational materials regarding the benefits of attaining and maintaining a normal weight. We will continue to address and follow this issue during follow up visits.   Patient agreed with assessment and plan. Patient verbalized understanding.   Visit today included increased complexity associated with the care of the episodic problem hypertension, hyperlipidemia, diabetes addressed and managing the longitudinal care of the patient due to the serious and/or complex managed problem(s) hypertension, hyperlipidemia, diabetes.

## 2025-04-13 DIAGNOSIS — M54.42 CHRONIC LEFT-SIDED LOW BACK PAIN WITH LEFT-SIDED SCIATICA: ICD-10-CM

## 2025-04-13 DIAGNOSIS — G89.29 CHRONIC LEFT-SIDED LOW BACK PAIN WITH LEFT-SIDED SCIATICA: ICD-10-CM

## 2025-04-13 RX ORDER — GABAPENTIN 300 MG/1
300 CAPSULE ORAL NIGHTLY
Qty: 90 CAPSULE | Refills: 0 | Status: SHIPPED | OUTPATIENT
Start: 2025-04-13

## 2025-04-13 NOTE — TELEPHONE ENCOUNTER
No care due was identified.  [facility] Embedded Care Due Messages. Reference number: [id number].   4/13/2025 4:20:22 PM CDT

## 2025-04-23 ENCOUNTER — CLINICAL SUPPORT (OUTPATIENT)
Dept: DIABETES | Facility: CLINIC | Age: 60
End: 2025-04-23
Payer: MEDICAID

## 2025-04-23 VITALS — HEIGHT: 64 IN | BODY MASS INDEX: 40.04 KG/M2 | WEIGHT: 234.56 LBS

## 2025-04-23 DIAGNOSIS — I10 DIABETES MELLITUS WITH COINCIDENT HYPERTENSION: ICD-10-CM

## 2025-04-23 DIAGNOSIS — E11.9 DIABETES MELLITUS WITH COINCIDENT HYPERTENSION: ICD-10-CM

## 2025-04-23 DIAGNOSIS — E11.65 TYPE 2 DIABETES MELLITUS WITH HYPERGLYCEMIA, WITHOUT LONG-TERM CURRENT USE OF INSULIN: Primary | ICD-10-CM

## 2025-04-23 PROCEDURE — 99999 PR PBB SHADOW E&M-EST. PATIENT-LVL II: CPT | Mod: PBBFAC,,, | Performed by: DIETITIAN, REGISTERED

## 2025-04-23 PROCEDURE — 99999PBSHW PR PBB SHADOW TECHNICAL ONLY FILED TO HB: Mod: PBBFAC,,,

## 2025-04-23 PROCEDURE — G0108 DIAB MANAGE TRN  PER INDIV: HCPCS | Mod: PBBFAC,PO | Performed by: DIETITIAN, REGISTERED

## 2025-04-23 PROCEDURE — 99212 OFFICE O/P EST SF 10 MIN: CPT | Mod: PBBFAC,PO | Performed by: DIETITIAN, REGISTERED

## 2025-04-23 RX ORDER — LANCETS
EACH MISCELLANEOUS
Qty: 100 EACH | Refills: 6 | Status: SHIPPED | OUTPATIENT
Start: 2025-04-23

## 2025-04-23 RX ORDER — INSULIN PUMP SYRINGE, 3 ML
EACH MISCELLANEOUS
Qty: 1 EACH | Refills: 0 | Status: SHIPPED | OUTPATIENT
Start: 2025-04-23 | End: 2026-04-23

## 2025-04-23 NOTE — PROGRESS NOTES
"Diabetes Care Specialist Progress Note  Author: Radha Cyr RD, [facility] [place]  Date: 4/23/2025    Intake    Program Intake  Reason for Diabetes Program Visit:: Intervention-Patient was referred for help getting her ihealth glucometer paired with her phone and instructions on how to work it.  She has seen Nora at the [facility] twice but never started using it.  Type of Intervention:: Individual  Individual: Device Training  Device Training: Other  Current diabetes risk level:: low  In the last month, have you used the ER or been admitted to the hospital: No  Permission to speak with others about care:: yes    Current Diabetes Treatment: DM Injectables  DM Injectables Type/Dose:  (Ozempic once weekly)    Continuous Glucose Monitoring  Patient has CGM: No    Lab Results   Component Value Date    HGBA1C 6.2 (H) 04/03/2025     Weight: 106.4 kg (234 lb 9.1 oz)   Height: 5' 4" (162.6 cm)   Body mass index is 40.26 kg/m².    Diabetes Self-Management Skills Assessment    Medication Skills Assessment  Patient is able to identify current diabetes medications, dosages, and appropriate timing of medications.: yes  Patient reports problems or concerns with current medication regimen.: no  Patient is  aware that some diabetes medications can cause low blood sugar?: Yes  Medication Skills Assessment Completed:: Yes  Assessment indicates:: Adequate understanding  Area of need?: No    Home Blood Glucose Monitoring  Patient states that blood sugar is checked at home daily.: no-reports she has never been able to use the ihealth meter she was provided  What is your A1c Target?:  (<6.5%)  Home Blood Glucose Monitoring Skills Assessment Completed: : Yes  Assessment indicates:: Instruction Needed  Area of need?: Yes    Acute Complications  Have you ever had hypoglycemia (low BG 70 or less)?:  unsure- thinks she may be getting low but has never been able to test  Acute Complications Skills Assessment Completed: : Yes  Assessment indicates:: " Instruction Needed  Area of need?: Yes    Assessment Summary and Plan    Based on today's diabetes care assessment, the following areas of need were identified:      Identified Areas of Need      Medication/Current Diabetes Treatment: No   Lifestyle Coping/Support:     Diabetes Disease Process/Treatment Options:     Nutrition/Healthy Eating:      Physical Activity/Exercise:      Home Blood Glucose Monitoring: Yes -script for new glucometer and strips sent to Dr. Servin to send to her pharmacy/see care plan   Acute Complications: Yes - reviewed prevention, treatment and symptoms of hypoglycemia   Chronic Complications:       Today's interventions were provided through individual discussion, instruction, and written materials were provided.      Patient verbalized understanding of instruction and written materials.  Pt was able to return back demonstration of instructions today. Patient understood key points, needs reinforcement and further instruction.     Diabetes Self-Management Care Plan:    Today's Diabetes Self-Management Care Plan was developed with Lori's input. Lori has agreed to work toward the following goal(s) to improve his/her overall diabetes control.      Care Plan: Diabetes Management   Updates made since 4/23/2024 12:00 AM        Problem: Blood Glucose Self-Monitoring         Goal: Patient agrees to check and record blood sugars once per day at alternating times.    Start Date: 4/23/2025   Priority: Medium   Barriers: Lack of Supplies   Note:    Patient and  report that both of their phones are hacked and that they are unable to do many things on their phone regarding signing onto certain apps and with bluetooth functionality.  They have talked to [employer], [employer] and [employer] carrier about this issue which is ongoing.  Nora was able to come try to assist with getting her ihealth meter to work and was unsuccessful at getting it paired up with her phone.  She is unable to use the glucometer as  a result.  I will send new scripts for a traditional glucometer to be sent to her local pharmacy as we may need to go that route until they can get issues with their phones sorted out.  Reviewed frequency and timing of monitoring and encouraged her to keep a log to bring to clinic visits for review.  Goal blood sugar and A1c target reviewed as well.       Follow Up Plan     Follow up in about 2 weeks (around 5/7/2025).  Patient will let me know once she picks up her new glucometer and supplies and we can schedule a f/u for training if necessary at that point.  Went over monitoring basics today but may need more specific help getting it set up once she can get it from the pharmacy.  Written materials provided and encouraged pt to call with any questions/concerns in interim if needed.      Today's care plan and follow up schedule was discussed with patient.  Lori verbalized understanding of the care plan, goals, and agrees to follow up plan.        The patient was encouraged to communicate with his/her health care provider/physician and care team regarding his/her condition(s) and treatment.  I provided the patient with my contact information today and encouraged to contact me via phone or [facility] Patient Portal as needed.     Length of Visit   Total Time: 60 Minutes

## 2025-04-28 ENCOUNTER — PATIENT OUTREACH (OUTPATIENT)
Dept: ADMINISTRATIVE | Facility: HOSPITAL | Age: 60
End: 2025-04-28
Payer: MEDICAID

## 2025-04-28 NOTE — PROGRESS NOTES
Population Health Chart Review & Patient Outreach Details      Additional [facility] Health Notes:               Updates Requested / Reviewed:      Updated Care Coordination Note, Care Everywhere, , and Immunizations Reconciliation Completed or Queried: [facility] Topics Overdue:      [facility] Score: 2     Colon Cancer Screening  Foot Exam                       Health Maintenance Topic(s) Outreach Outcomes & Actions Taken:    Medication Adherence / Statins - Outreach Outcomes & Actions Taken  : The patient is currently taking Pravastatin 10mg

## 2025-04-29 ENCOUNTER — PATIENT MESSAGE (OUTPATIENT)
Dept: DIABETES | Facility: CLINIC | Age: 60
End: 2025-04-29
Payer: MEDICAID

## 2025-05-04 DIAGNOSIS — I10 ESSENTIAL HYPERTENSION: ICD-10-CM

## 2025-05-04 RX ORDER — BENAZEPRIL HYDROCHLORIDE 10 MG/1
10 TABLET ORAL
Qty: 90 TABLET | Refills: 3 | Status: SHIPPED | OUTPATIENT
Start: 2025-05-04

## 2025-05-04 NOTE — TELEPHONE ENCOUNTER
Refill Decision Note   Lori Javier  is requesting a refill authorization.  Brief Assessment and Rationale for Refill:  Approve     Medication Therapy Plan:        Comments:     Note composed:9:38 AM 05/04/2025

## 2025-05-04 NOTE — TELEPHONE ENCOUNTER
No care due was identified.  [facility] Embedded Care Due Messages. Reference number: [id number].   5/04/2025 12:18:46 AM CDT

## 2025-06-11 ENCOUNTER — PATIENT MESSAGE (OUTPATIENT)
Dept: FAMILY MEDICINE | Facility: CLINIC | Age: 60
End: 2025-06-11
Payer: MEDICAID

## 2025-06-11 ENCOUNTER — OFFICE VISIT (OUTPATIENT)
Dept: PHYSICAL MEDICINE AND REHAB | Facility: CLINIC | Age: 60
End: 2025-06-11
Payer: MEDICAID

## 2025-06-11 DIAGNOSIS — G56.03 BILATERAL CARPAL TUNNEL SYNDROME: Primary | ICD-10-CM

## 2025-06-11 DIAGNOSIS — R20.0 NUMBNESS: ICD-10-CM

## 2025-06-11 PROCEDURE — 95911 NRV CNDJ TEST 9-10 STUDIES: CPT | Mod: PBBFAC,PO | Performed by: PHYSICAL MEDICINE & REHABILITATION

## 2025-06-11 PROCEDURE — 99999 PR PBB SHADOW E&M-EST. PATIENT-LVL II: CPT | Mod: PBBFAC,,, | Performed by: PHYSICAL MEDICINE & REHABILITATION

## 2025-06-11 PROCEDURE — 95886 MUSC TEST DONE W/N TEST COMP: CPT | Mod: 26,S$PBB,, | Performed by: PHYSICAL MEDICINE & REHABILITATION

## 2025-06-11 PROCEDURE — 99212 OFFICE O/P EST SF 10 MIN: CPT | Mod: PBBFAC,PO | Performed by: PHYSICAL MEDICINE & REHABILITATION

## 2025-06-11 PROCEDURE — 95886 MUSC TEST DONE W/N TEST COMP: CPT | Mod: PBBFAC,PO | Performed by: PHYSICAL MEDICINE & REHABILITATION

## 2025-06-11 PROCEDURE — 95911 NRV CNDJ TEST 9-10 STUDIES: CPT | Mod: 26,S$PBB,, | Performed by: PHYSICAL MEDICINE & REHABILITATION

## 2025-06-11 PROCEDURE — 99499 UNLISTED E&M SERVICE: CPT | Mod: S$PBB,,, | Performed by: PHYSICAL MEDICINE & REHABILITATION

## 2025-06-11 NOTE — PROGRESS NOTES
[facility]  [address] [place] [address]             Full Name: Lori Herrera Gender: Female  MRN: [id number] YOB: 1965  History: Pre-diabetic complaining of numbness and tingling of bilateral hands. Left sided neck pain.        Visit Date: 6/11/2025 11:45 AM  Age: 59 Years  Examining Physician: Litzy Guerra DO   Referring Physician: Carie Servin MD   Height: 5 feet 4 inch      Sensory NCS      Nerve / Sites Rec. Site Onset Lat Peak Lat NP Amp PP Amp Segments Distance Velocity     ms ms µV µV  cm m/s   L Median - Digit III (Antidromic)      Wrist Dig III 3.38 4.52 11.5 17.3 Wrist - Dig III 14 41   R Median - Digit III (Antidromic)      Wrist Dig III 2.88 3.60 19.7 30.0 Wrist - Dig III 14 49   L Ulnar - Digit V (Antidromic)      Wrist Dig V 1.85 2.83 13.6 13.0 Wrist - Dig V 14 76   R Ulnar - Digit V (Antidromic)      Wrist Dig V 1.98 2.65 18.9 20.5 Wrist - Dig V 14 71   L Radial - Anatomical snuff box (Forearm)      Forearm Wrist 1.13 1.75 49.2 6.0 Forearm - Wrist 10 89   R Radial - Anatomical snuff box (Forearm)      Forearm Wrist 1.25 1.81 58.1 20.8 Forearm - Wrist 10 80       Motor NCS      Nerve / Sites Muscle Latency Amplitude Amp % Duration Segments Distance Lat Diff Velocity     ms mV % ms  cm ms m/s   L Median - APB      Wrist APB 4.44 6.5 100 7.00 Wrist - APB 8        Elbow APB 8.27 5.5 83.9 7.02 Elbow - Wrist 19 3.83 50   R Median - APB      Wrist APB 3.60 7.6 100 4.69 Wrist - APB 8        Elbow APB 7.60 5.6 73.4 4.33 Elbow - Wrist 20 4.00 50   L Ulnar - ADM      Wrist ADM 2.65 12.4 100 5.63 Wrist - ADM 8        B.Elbow ADM 6.06 9.7 78.2 6.40 B.Elbow - Wrist 19 3.42 56      A.Elbow ADM 7.90 8.5 68.7 5.48 A.Elbow - B.Elbow 10 1.83 55   R Ulnar - ADM      Wrist ADM 2.35 12.3 100 5.63 Wrist - ADM 8        B.Elbow ADM 5.77 11.3 91.5 5.71 B.Elbow - Wrist 19 3.42 56      A.Elbow ADM 7.56 10.3 83.5 5.83 A.Elbow - B.Elbow 10 1.79 56       EMG Summary Table     Spontaneous  MUAP Recruitment   Muscle IA Fib PSW Fasc CRD Amp Dur. Poly Pattern   L. Deltoid N None None None None N N None N   L. Biceps brachii N None None None None N N None N   L. Triceps brachii N None None None None N N None N   L. Pronator teres N None None None None N N None N   L. Abductor pollicis brevis N None None None None N N None N   R. Deltoid N None None None None N N None N   R. Biceps brachii N None None None None N N None N   R. Triceps brachii N None None None None N N None N   R. Pronator teres N None None None None N N None N   R. Abductor pollicis brevis N None None None None N N None N       Summary    The motor conduction test was performed on 4 nerve(s). The results were normal in 3 nerve(s): R Median - APB, L Ulnar - ADM, R Ulnar - ADM. Results outside the specified normal range were found in 1 nerve(s), as follows:  In the L Median - APB study  the take off latency result was increased for Wrist stimulation    The sensory conduction test was performed on 6 nerve(s). The results were normal in 4 nerve(s): L Ulnar - Digit V (Antidromic), R Ulnar - Digit V (Antidromic), L Radial - Anatomical snuff box (Forearm), R Radial - Anatomical snuff box (Forearm). Results outside the specified normal range were found in 2 nerve(s), as follows:  In the L Median - Digit III (Antidromic) study  the peak latency result was increased for Wrist stimulation  the peak amplitude result was reduced for Wrist stimulation  In the R Median - Digit III (Antidromic) study  the peak latency result was increased for Wrist stimulation    The needle EMG study was normal in all 10 tested muscles: L. Deltoid, L. Biceps brachii, L. Triceps brachii, L. Pronator teres, L. Abductor pollicis brevis, R. Deltoid, R. Biceps brachii, R. Triceps brachii, R. Pronator teres, R. Abductor pollicis brevis.       Impression:  Abnormal study.    Moderate left carpal tunnel syndrome, without active denervation.  Mild right carpal tunnel syndrome,  without active denervation.  No electrophysiologic of bilateral cervical radiculopathy/plexopathy, bilateral ulnar mononeuropathy, or peripheral neuropathy in bilateral upper extremities.    ------------------------------  Litzy Guerra, DO

## 2025-06-21 DIAGNOSIS — M54.2 NECK PAIN: ICD-10-CM

## 2025-06-21 NOTE — TELEPHONE ENCOUNTER
No care due was identified.  [facility] Embedded Care Due Messages. Reference number: [id number].   6/21/2025 12:20:26 AM CDT

## 2025-06-23 RX ORDER — CYCLOBENZAPRINE HCL 10 MG
10 TABLET ORAL NIGHTLY
Qty: 30 TABLET | Refills: 0 | Status: SHIPPED | OUTPATIENT
Start: 2025-06-23

## 2025-07-02 ENCOUNTER — TELEPHONE (OUTPATIENT)
Dept: FAMILY MEDICINE | Facility: CLINIC | Age: 60
End: 2025-07-02
Payer: MEDICAID

## 2025-07-02 NOTE — TELEPHONE ENCOUNTER
Copied from CRM #[id number]. Topic: Appointments - Appointment Rescheduling  >> Jul 2, 2025 10:02 AM Rukhsana wrote:  Type:  Reschedule Appointment Request    Caller is requesting to reschedule appointment.      Name of Caller:pt    When is the first available appointment?scheduled for 7/3    Symptoms:Follow up    Would the patient rather a call back or a response via [facility]? Call back    Best Call Back Number:[phone]      Additional Information: Medicaid PT unable to reschedule     Please call Back to advise. Thanks!

## 2025-07-04 ENCOUNTER — PATIENT MESSAGE (OUTPATIENT)
Dept: FAMILY MEDICINE | Facility: CLINIC | Age: 60
End: 2025-07-04
Payer: MEDICAID

## 2025-07-04 DIAGNOSIS — M54.42 CHRONIC LEFT-SIDED LOW BACK PAIN WITH LEFT-SIDED SCIATICA: ICD-10-CM

## 2025-07-04 DIAGNOSIS — G89.29 CHRONIC LEFT-SIDED LOW BACK PAIN WITH LEFT-SIDED SCIATICA: ICD-10-CM

## 2025-07-04 RX ORDER — GABAPENTIN 300 MG/1
300 CAPSULE ORAL NIGHTLY
Qty: 30 CAPSULE | Refills: 2 | Status: SHIPPED | OUTPATIENT
Start: 2025-07-04

## 2025-07-04 NOTE — TELEPHONE ENCOUNTER
Care Due:                  Date            Visit Type   Department     Provider  --------------------------------------------------------------------------------                                EP -                              [facility]  Last Visit: 04-      CARE ([facility])   VALORIE Servin                               -                              [facility]  Next Visit: 07-      CARE ([facility])   VALOREI Servin                                                            Last  Test          Frequency    Reason                     Performed    Due Date  --------------------------------------------------------------------------------    HBA1C.......  6 months...  OZEMPIC..................  04-   10-    [facility] Embedded Care Due Messages. Reference number: [id number].   7/04/2025 12:18:38 AM CDT

## 2025-07-08 ENCOUNTER — PATIENT MESSAGE (OUTPATIENT)
Dept: FAMILY MEDICINE | Facility: CLINIC | Age: 60
End: 2025-07-08
Payer: MEDICAID

## 2025-07-11 ENCOUNTER — TELEPHONE (OUTPATIENT)
Dept: FAMILY MEDICINE | Facility: CLINIC | Age: 60
End: 2025-07-11
Payer: MEDICAID

## 2025-07-11 NOTE — TELEPHONE ENCOUNTER
Copied from CRM #[id number]. Topic: Appointments - Appointment Rescheduling  >> Jul 11, 2025 12:39 PM Rukhsana wrote:  Type: Appointment Request    Caller is requesting appointment.      Name of Caller:pt    When is the first available appointment?na    Symptoms: 3 mfu    Would the patient rather a call back or a response via [facility]? Call back    Best Call Back Number: [phone]      Additional Information: Missed appt on 7/9     Please call Back to advise. Thanks!

## 2025-07-18 ENCOUNTER — TELEPHONE (OUTPATIENT)
Dept: FAMILY MEDICINE | Facility: CLINIC | Age: 60
End: 2025-07-18
Payer: MEDICAID

## 2025-07-18 PROBLEM — S82.842A BIMALLEOLAR ANKLE FRACTURE, LEFT, CLOSED, INITIAL ENCOUNTER: Status: ACTIVE | Noted: 2025-07-18

## 2025-07-18 NOTE — TELEPHONE ENCOUNTER
Copied from CRM #[id number]. Topic: Appointments - Appointment Access  >> Jul 18, 2025  1:13 PM Kenya wrote:  Type:  Sooner Apoointment Request    Caller is requesting a sooner appointment.  Caller declined first available appointment listed below.  Caller will not accept being placed on the waitlist and is requesting a message be sent to doctor.  Name of Caller:pt   When is the first available appointment? N/a  Symptoms: n/a  Would the patient rather a call back or a response via [facility]? Call back   Best Call Back Number:[phone]    Additional Information: pt needs to reschedule her follow up appt

## 2025-07-19 PROBLEM — Z73.6 LIMITATION OF ACTIVITY DUE TO DISABILITY: Status: ACTIVE | Noted: 2025-07-19

## 2025-07-26 DIAGNOSIS — M54.2 NECK PAIN: ICD-10-CM

## 2025-07-26 NOTE — TELEPHONE ENCOUNTER
No care due was identified.  [facility] Embedded Care Due Messages. Reference number: [id number].   7/26/2025 12:20:46 AM CDT

## 2025-07-27 RX ORDER — CYCLOBENZAPRINE HCL 10 MG
10 TABLET ORAL NIGHTLY
Qty: 30 TABLET | Refills: 0 | Status: SHIPPED | OUTPATIENT
Start: 2025-07-27

## 2025-07-30 DIAGNOSIS — Z12.31 OTHER SCREENING MAMMOGRAM: ICD-10-CM

## 2025-08-10 DIAGNOSIS — I10 ESSENTIAL HYPERTENSION: ICD-10-CM

## 2025-08-10 DIAGNOSIS — R60.0 LOCALIZED EDEMA: ICD-10-CM

## 2025-08-11 RX ORDER — HYDROCHLOROTHIAZIDE 12.5 MG/1
12.5 TABLET ORAL
Qty: 90 TABLET | Refills: 3 | Status: SHIPPED | OUTPATIENT
Start: 2025-08-11

## 2025-08-26 ENCOUNTER — TELEPHONE (OUTPATIENT)
Dept: FAMILY MEDICINE | Facility: CLINIC | Age: 60
End: 2025-08-26
Payer: MEDICAID

## 2025-09-02 DIAGNOSIS — S82.842A CLOSED BIMALLEOLAR FRACTURE OF LEFT ANKLE, INITIAL ENCOUNTER: ICD-10-CM

## 2025-09-02 DIAGNOSIS — M54.42 CHRONIC LEFT-SIDED LOW BACK PAIN WITH LEFT-SIDED SCIATICA: ICD-10-CM

## 2025-09-02 DIAGNOSIS — G89.29 CHRONIC LEFT-SIDED LOW BACK PAIN WITH LEFT-SIDED SCIATICA: ICD-10-CM

## 2025-09-02 RX ORDER — GABAPENTIN 300 MG/1
300 CAPSULE ORAL NIGHTLY
Qty: 30 CAPSULE | Refills: 2 | Status: SHIPPED | OUTPATIENT
Start: 2025-09-08

## 2025-09-02 RX ORDER — HYDROCODONE BITARTRATE AND ACETAMINOPHEN 10; 325 MG/1; MG/1
1 TABLET ORAL EVERY 6 HOURS PRN
Qty: 28 TABLET | Refills: 0 | OUTPATIENT
Start: 2025-09-02

## 2025-09-03 DIAGNOSIS — M54.2 NECK PAIN: ICD-10-CM

## 2025-09-04 DIAGNOSIS — E11.65 TYPE 2 DIABETES MELLITUS WITH HYPERGLYCEMIA, WITHOUT LONG-TERM CURRENT USE OF INSULIN: ICD-10-CM

## 2025-09-04 RX ORDER — CYCLOBENZAPRINE HCL 10 MG
10 TABLET ORAL NIGHTLY
Qty: 30 TABLET | Refills: 0 | Status: SHIPPED | OUTPATIENT
Start: 2025-09-04

## 2025-09-04 RX ORDER — SEMAGLUTIDE 0.68 MG/ML
0.5 INJECTION, SOLUTION SUBCUTANEOUS
Qty: 3 EACH | Refills: 2 | Status: SHIPPED | OUTPATIENT
Start: 2025-09-04